# Patient Record
Sex: MALE | Race: WHITE
[De-identification: names, ages, dates, MRNs, and addresses within clinical notes are randomized per-mention and may not be internally consistent; named-entity substitution may affect disease eponyms.]

---

## 2019-07-05 NOTE — REPVR
EXAM: 

 CT Head Without Contrast 



EXAM DATE/TIME: 

 7/5/2019 10:32 PM 



CLINICAL HISTORY: 

 29 years old, male; Injury or trauma; Injury history: Hit head off wall 

multiple times; Initial encounter; Blunt trauma (contusions or hematomas); 

Consciousness not specified; Additional info: Tr 



TECHNIQUE: 

 Imaging protocol: Axial computed tomography images of the head without 

contrast. 

 Radiation optimization: All CT scans at this facility use at least one of 

these dose optimization techniques: automated exposure control; mA and/or kV 

adjustment per patient size (includes targeted exams where dose is matched to 

clinical indication); or iterative reconstruction. 



COMPARISON: 

 No relevant prior studies available. 



FINDINGS: 

 Brain: Normal. No hemorrhage. Unremarkable white matter. No mass effect. 

 Ventricles: Normal. No ventriculomegaly. 

 Bones/joints: Unremarkable. No acute fracture. 

 Sinuses: Visualized sinuses are unremarkable. No fluid levels. 

 Mastoid air cells: Visualized mastoid air cells are well aerated. No mastoid 

effusion. 

 Soft tissues: Frontal scalp injury.



IMPRESSION: 

No acute intracranial abnormality. 



Electronically signed by: Mirna Rivas On 07/05/2019  22:55:10 PM

## 2020-05-02 ENCOUNTER — HOSPITAL ENCOUNTER (EMERGENCY)
Dept: HOSPITAL 53 - M ED | Age: 30
Discharge: ANOTHER HOSPITAL | End: 2020-05-02
Payer: SELF-PAY

## 2020-05-02 VITALS — BODY MASS INDEX: 25.13 KG/M2 | HEIGHT: 73 IN | WEIGHT: 189.6 LBS

## 2020-05-02 VITALS — SYSTOLIC BLOOD PRESSURE: 120 MMHG | DIASTOLIC BLOOD PRESSURE: 69 MMHG

## 2020-05-02 DIAGNOSIS — M25.572: ICD-10-CM

## 2020-05-02 DIAGNOSIS — G06.2: Primary | ICD-10-CM

## 2020-05-02 DIAGNOSIS — L03.116: ICD-10-CM

## 2020-05-02 DIAGNOSIS — Z87.891: ICD-10-CM

## 2020-05-02 DIAGNOSIS — F19.10: ICD-10-CM

## 2020-05-02 LAB
ALBUMIN SERPL BCG-MCNC: 3.2 GM/DL (ref 3.2–5.2)
ALT SERPL W P-5'-P-CCNC: 48 U/L (ref 12–78)
AMYLASE SERPL-CCNC: 22 U/L (ref 25–115)
APTT BLD: 35.8 SECONDS (ref 25–38.4)
BASOPHILS # BLD AUTO: 0.1 10^3/UL (ref 0–0.2)
BASOPHILS NFR BLD AUTO: 0.6 % (ref 0–1)
BILIRUB CONJ SERPL-MCNC: 0.2 MG/DL (ref 0–0.2)
BILIRUB SERPL-MCNC: 0.7 MG/DL (ref 0.2–1)
CK MB CFR.DF SERPL CALC: 2.46
CK MB SERPL-MCNC: 1.6 NG/ML (ref ?–3.6)
CK SERPL-CCNC: 65 U/L (ref 39–308)
CRP SERPL-MCNC: 6.57 MG/DL (ref 0–0.3)
EOSINOPHIL # BLD AUTO: 0.2 10^3/UL (ref 0–0.5)
EOSINOPHIL NFR BLD AUTO: 1.7 % (ref 0–3)
ERYTHROCYTE [SEDIMENTATION RATE] IN BLOOD BY WESTERGREN METHOD: 109 MM/HR (ref 0–15)
HCT VFR BLD AUTO: 32.1 % (ref 42–52)
HGB BLD-MCNC: 10.4 G/DL (ref 13.5–17.5)
INR PPP: 1.04
LIPASE SERPL-CCNC: 58 U/L (ref 73–393)
LYMPHOCYTES # BLD AUTO: 2.4 10^3/UL (ref 1.5–5)
LYMPHOCYTES NFR BLD AUTO: 25.2 % (ref 24–44)
MCH RBC QN AUTO: 28.9 PG (ref 27–33)
MCHC RBC AUTO-ENTMCNC: 32.4 G/DL (ref 32–36.5)
MCV RBC AUTO: 89.2 FL (ref 80–96)
MONOCYTES # BLD AUTO: 1.2 10^3/UL (ref 0–0.8)
MONOCYTES NFR BLD AUTO: 12.9 % (ref 0–5)
NEUTROPHILS # BLD AUTO: 5.6 10^3/UL (ref 1.5–8.5)
NEUTROPHILS NFR BLD AUTO: 59.2 % (ref 36–66)
PLATELET # BLD AUTO: 253 10^3/UL (ref 150–450)
PROT SERPL-MCNC: 7.9 GM/DL (ref 6.4–8.2)
PROTHROMBIN TIME: 13.3 SECONDS (ref 11.8–14)
RBC # BLD AUTO: 3.6 10^6/UL (ref 4.3–6.1)
TROPONIN I SERPL-MCNC: < 0.02 NG/ML (ref ?–0.1)
WBC # BLD AUTO: 9.5 10^3/UL (ref 4–10)

## 2020-05-02 PROCEDURE — 72072 X-RAY EXAM THORAC SPINE 3VWS: CPT

## 2020-05-02 PROCEDURE — 82150 ASSAY OF AMYLASE: CPT

## 2020-05-02 PROCEDURE — 85025 COMPLETE CBC W/AUTO DIFF WBC: CPT

## 2020-05-02 PROCEDURE — 72157 MRI CHEST SPINE W/O & W/DYE: CPT

## 2020-05-02 PROCEDURE — 96365 THER/PROPH/DIAG IV INF INIT: CPT

## 2020-05-02 PROCEDURE — 83605 ASSAY OF LACTIC ACID: CPT

## 2020-05-02 PROCEDURE — 96375 TX/PRO/DX INJ NEW DRUG ADDON: CPT

## 2020-05-02 PROCEDURE — 96366 THER/PROPH/DIAG IV INF ADDON: CPT

## 2020-05-02 PROCEDURE — 83690 ASSAY OF LIPASE: CPT

## 2020-05-02 PROCEDURE — 71045 X-RAY EXAM CHEST 1 VIEW: CPT

## 2020-05-02 PROCEDURE — 93005 ELECTROCARDIOGRAM TRACING: CPT

## 2020-05-02 PROCEDURE — 72158 MRI LUMBAR SPINE W/O & W/DYE: CPT

## 2020-05-02 PROCEDURE — 96368 THER/DIAG CONCURRENT INF: CPT

## 2020-05-02 PROCEDURE — 86140 C-REACTIVE PROTEIN: CPT

## 2020-05-02 PROCEDURE — 80047 BASIC METABLC PNL IONIZED CA: CPT

## 2020-05-02 PROCEDURE — 93041 RHYTHM ECG TRACING: CPT

## 2020-05-02 PROCEDURE — 80076 HEPATIC FUNCTION PANEL: CPT

## 2020-05-02 PROCEDURE — 86850 RBC ANTIBODY SCREEN: CPT

## 2020-05-02 PROCEDURE — 72110 X-RAY EXAM L-2 SPINE 4/>VWS: CPT

## 2020-05-02 PROCEDURE — 81001 URINALYSIS AUTO W/SCOPE: CPT

## 2020-05-02 PROCEDURE — 85610 PROTHROMBIN TIME: CPT

## 2020-05-02 PROCEDURE — 74018 RADEX ABDOMEN 1 VIEW: CPT

## 2020-05-02 PROCEDURE — 82553 CREATINE MB FRACTION: CPT

## 2020-05-02 PROCEDURE — 82550 ASSAY OF CK (CPK): CPT

## 2020-05-02 PROCEDURE — 86901 BLOOD TYPING SEROLOGIC RH(D): CPT

## 2020-05-02 PROCEDURE — 85730 THROMBOPLASTIN TIME PARTIAL: CPT

## 2020-05-02 PROCEDURE — 99285 EMERGENCY DEPT VISIT HI MDM: CPT

## 2020-05-02 PROCEDURE — 85652 RBC SED RATE AUTOMATED: CPT

## 2020-05-02 PROCEDURE — 87040 BLOOD CULTURE FOR BACTERIA: CPT

## 2020-05-02 PROCEDURE — 73610 X-RAY EXAM OF ANKLE: CPT

## 2020-05-02 PROCEDURE — 86900 BLOOD TYPING SEROLOGIC ABO: CPT

## 2020-05-02 PROCEDURE — 36415 COLL VENOUS BLD VENIPUNCTURE: CPT

## 2020-05-02 NOTE — REP
Clinical:  Rule out foreign body.  IVDA.

 

Comparison:  None .

 

Findings:

The mediastinum and cardiac silhouette are stable and within normal limits for

portable technique.  The lung fields are clear without acute consolidation,

effusion, or pneumothorax.  Skeletal structures are intact. No foreign body

identified.

 

Impression:

No acute cardiopulmonary process appreciated.

 

 

Electronically Signed by

Tyrese Garsia MD 05/02/2020 11:26 A

## 2020-05-02 NOTE — REP
Clinical:  IVDA.  Foreign body.

 

Technique:  AP, lateral, bilateral oblique, and coned-down views.

 

Findings:  Alignment and lordosis is maintained.  The vertebral bodies including

transverse process and spinous processes are intact and normal.  There is no

evidence for acute fracture / compression injury or subluxation.  No evidence for

spondylolysis or spondylolisthesis.  No significant degenerative change is noted.

No foreign body identified.

 

Impression:

Normal lumbosacral spine radiograph series.

 

 

Electronically Signed by

Tyrese Garsia MD 05/02/2020 11:29 A

## 2020-05-02 NOTE — REP
Clinical:  Rule out foreign body.  IUDA.

 

Technique:  AP, lateral, and swimmers views.

 

Findings:  No acute fracture / compression injury or subluxation.  No significant

degenerative changes.  Paravertebral soft tissues are normal. No foreign body

identified.

 

Impression:

No foreign body identified.

 

 

Electronically Signed by

Tyrese Garsia MD 05/02/2020 11:28 A

## 2020-05-02 NOTE — REP
Clinical:  Pain.  Redness.

 

Technique:  AP, lateral, bilateral oblique views of the left ankle.

 

Findings:

Swelling suggested.  No acute fracture or dislocation.  Ankle mortise intact.

 

Impression:

Soft-tissue swelling.  No acute fracture.

 

 

Electronically Signed by

Tyrese Garsia MD 05/02/2020 11:25 A

## 2020-05-02 NOTE — ECGEPIP
[facility] - ED

                                       

                                       Test Date:    2020

Pat Name:     ANAT CALDERA               Department:   

Patient ID:   [id number]                 Room:         -

Gender:       Male                     Technician:   grant

:          1990               Requested By: Maja Lundborg-Gray 

Order Number: [id number]     Reading MD:   Maja Lundborg-Gray

                                 Measurements

Intervals                              Axis          

Rate:         82                       P:            35

IL:           151                      QRS:          65

QRSD:         93                       T:            21

QT:           354                                    

QTc:          414                                    

                           Interpretive Statements

SINUS RHYTHM

NONSPECIFIC ST T WAVE CHANGES

NO PRIOR ECG FOR COMPARISON

DELAYED R WAVE PROGRESSION

Electronically Signed on 2020 19:29:54 EDT by Maja Lundborg-Gray

## 2020-05-02 NOTE — REP
Clinical:  IVDA.  Rule out foreign body.

 

Technique:  Single supine view of the abdomen and pelvis.

 

Findings:  Bowel gas pattern is nonspecific.  No organomegaly.  No abnormal

calcifications or foreign body.  Skeletal structures are intact.

 

Impression:  Nonspecific abdominal radiograph.

 

 

Electronically Signed by

Tyrese Garsia MD 05/02/2020 11:27 A

## 2020-05-21 ENCOUNTER — HOSPITAL ENCOUNTER (INPATIENT)
Dept: HOSPITAL 53 - M ED | Age: 30
LOS: 1 days | Discharge: LEFT BEFORE DISCHARGE | DRG: 347 | End: 2020-05-22
Attending: INTERNAL MEDICINE | Admitting: INTERNAL MEDICINE
Payer: MEDICAID

## 2020-05-21 VITALS — DIASTOLIC BLOOD PRESSURE: 88 MMHG | SYSTOLIC BLOOD PRESSURE: 138 MMHG

## 2020-05-21 VITALS — WEIGHT: 194.01 LBS | HEIGHT: 73 IN | BODY MASS INDEX: 25.71 KG/M2

## 2020-05-21 VITALS — SYSTOLIC BLOOD PRESSURE: 160 MMHG | DIASTOLIC BLOOD PRESSURE: 77 MMHG

## 2020-05-21 DIAGNOSIS — M46.44: Primary | ICD-10-CM

## 2020-05-21 DIAGNOSIS — F15.20: ICD-10-CM

## 2020-05-21 DIAGNOSIS — R60.0: ICD-10-CM

## 2020-05-21 DIAGNOSIS — B18.2: ICD-10-CM

## 2020-05-21 DIAGNOSIS — L53.0: ICD-10-CM

## 2020-05-21 DIAGNOSIS — F11.20: ICD-10-CM

## 2020-05-21 DIAGNOSIS — R45.851: ICD-10-CM

## 2020-05-21 DIAGNOSIS — D64.9: ICD-10-CM

## 2020-05-21 DIAGNOSIS — Z79.899: ICD-10-CM

## 2020-05-21 DIAGNOSIS — R59.0: ICD-10-CM

## 2020-05-21 LAB
ALBUMIN SERPL BCG-MCNC: 3.6 GM/DL (ref 3.2–5.2)
ALT SERPL W P-5'-P-CCNC: 124 U/L (ref 12–78)
AMPHETAMINES UR QL SCN: NEGATIVE
APAP SERPL-MCNC: < 2 UG/ML (ref 10–30)
BARBITURATES UR QL SCN: NEGATIVE
BENZODIAZ UR QL SCN: NEGATIVE
BILIRUB CONJ SERPL-MCNC: 0.3 MG/DL (ref 0–0.2)
BILIRUB SERPL-MCNC: 0.9 MG/DL (ref 0.2–1)
BUN SERPL-MCNC: 17 MG/DL (ref 7–18)
BZE UR QL SCN: POSITIVE
CALCIUM SERPL-MCNC: 9.1 MG/DL (ref 8.5–10.1)
CANNABINOIDS UR QL SCN: NEGATIVE
CHLORIDE SERPL-SCNC: 105 MEQ/L (ref 98–107)
CO2 SERPL-SCNC: 24 MEQ/L (ref 21–32)
CREAT SERPL-MCNC: 0.9 MG/DL (ref 0.7–1.3)
CRP SERPL-MCNC: 13.8 MG/DL (ref 0–0.3)
ERYTHROCYTE [SEDIMENTATION RATE] IN BLOOD BY WESTERGREN METHOD: 63 MM/HR (ref 0–15)
ETHANOL SERPL-MCNC: < 0.003 % (ref 0–0.01)
GFR SERPL CREATININE-BSD FRML MDRD: > 60 ML/MIN/{1.73_M2} (ref 60–?)
GLUCOSE SERPL-MCNC: 89 MG/DL (ref 70–100)
HCT VFR BLD AUTO: 34 % (ref 42–52)
HGB BLD-MCNC: 11.1 G/DL (ref 13.5–17.5)
MAGNESIUM SERPL-MCNC: 2 MG/DL (ref 1.8–2.4)
MCH RBC QN AUTO: 29 PG (ref 27–33)
MCHC RBC AUTO-ENTMCNC: 32.6 G/DL (ref 32–36.5)
MCV RBC AUTO: 88.8 FL (ref 80–96)
METHADONE UR QL SCN: NEGATIVE
OPIATES UR QL SCN: POSITIVE
PCP UR QL SCN: NEGATIVE
PLATELET # BLD AUTO: 209 10^3/UL (ref 150–450)
POTASSIUM SERPL-SCNC: 3.6 MEQ/L (ref 3.5–5.1)
PROT SERPL-MCNC: 8 GM/DL (ref 6.4–8.2)
RBC # BLD AUTO: 3.83 10^6/UL (ref 4.3–6.1)
SALICYLATES SERPL-MCNC: < 1.7 MG/DL (ref 5–30)
SODIUM SERPL-SCNC: 140 MEQ/L (ref 136–145)
TSH SERPL DL<=0.005 MIU/L-ACNC: 1.15 UIU/ML (ref 0.36–3.74)
WBC # BLD AUTO: 7.6 10^3/UL (ref 4–10)

## 2020-05-21 RX ADMIN — SODIUM CHLORIDE SCH UNITS: 4.5 INJECTION, SOLUTION INTRAVENOUS at 20:38

## 2020-05-21 RX ADMIN — SODIUM CHLORIDE, PRESERVATIVE FREE SCH ML: 5 INJECTION INTRAVENOUS at 20:36

## 2020-05-21 RX ADMIN — CEFEPIME HYDROCHLORIDE SCH MLS/HR: 2 INJECTION, POWDER, FOR SOLUTION INTRAVENOUS at 15:45

## 2020-05-21 RX ADMIN — SODIUM CHLORIDE SCH UNITS: 4.5 INJECTION, SOLUTION INTRAVENOUS at 20:35

## 2020-05-21 RX ADMIN — CEFEPIME HYDROCHLORIDE SCH MLS/HR: 2 INJECTION, POWDER, FOR SOLUTION INTRAVENOUS at 23:44

## 2020-05-21 NOTE — REP
Duplex extremity venous ultrasound: Bilateral lower extremities.

 

History:  Bilateral lower extremity swelling, right greater than left.

 

Findings:  The deep veins are anechoic and fully compressible from the groin to

the popliteal fossa in the left and right lower extremity.  Color flow imaging is

homogeneous.  Spectral Doppler interrogation demonstrates intact respiratory

variation in flow and normal manual augmentation of flow.  There is no evidence

of deep vein thrombosis.

 

Multiple hypertrophied lymph nodes are noted in the inguinal soft tissues

bilaterally.  The largest on the right measures 3.7 x 2.0 x 1.0 cm.  The largest

lymph node on the left measures 2.6 x 0.9 x 2.0 cm.

 

Impression:

 

Bilateral inguinal adenopathy.  Otherwise negative bilateral lower extremity

duplex venous ultrasound.  No evidence of deep vein thrombosis.

 

 

Electronically Signed by

Tevin Rodriguez MD 05/21/2020 04:55 P

## 2020-05-21 NOTE — HPEPDOC
General


Date of Admission


May 21, 2020 at 14:19


Date of Service:  May 21, 2020


Chief Complaint


The patient is a 30-year-old male who was brought to the ER after he was found 

with holding a machete at a car wash





History of Present Illness


Patient is a 30-year-old  male with a PMHx of Substance abuse (with 

Intravenous Methamphetamines and Opiates), Hepatitis C, Anemia, who was recently

found to have Osteomyelitis / Diskitis of T8-T9.





Patient had originally presented to [facility] on 5/2/20 with 

complaints of back pain. Imaging completed via MRI head revealed evidence of 

phlegmon/abscess at the posterior aspect of T8/T9 extending to the right with 

narrowing of the right lateral recess and compression of the thecal sac. Patient

was subsequently transferred to [facility] for further management.





Upon transfer to [facility], patient received an IR guided biopsy on 5/4, 

which had revealed acute and chronic osteomyelitis. Orthopedic surgery was 

consult and had not recommended any surgery, but did recommended TLSO brace. An 

echocardiogram was performed and was negative for any evidence of endocarditis. 

Blood cultures were positive for Enterobacter cloacae. Infectious disease had 

recommended cefepime 2 g every 8 for total of 6 weeks duration, starting 5/6.





Patient was ultimately discharged from [facility] and transferred to 

[facility] for drug rehabilitation on 5/7. Patient had ultimately left [place] on 5/15

because he had reported that his girlfriend had left him.





Hospitalist service was called for evaluation. Patient was seen and examined 

while in the behavioral health section of the emergency room. Currently patient 

denies any headache, nausea, vomiting, chest pain, short of breath, 

palpitations, abdominal pain, constipation, diarrhea, or urinary discomfort.





Patient is not aware of any recent fevers or chills.





Patient reports his appetite is fairly normal. Has not reported any significant 

changes in his weight.





Home Medications


Scheduled


Levofloxacin (Levofloxacin) 750 Mg Tablet, 750 MG PO DAILY, (Reported)


   FILLED 5/16/20 FOR 14 DAYS 


Magnesium Oxide (Magnesium Oxide) 400 Mg Tablet, 400 MG PO DAILY, (Reported)


Multivitamins (Thera M Plus Tablet) 1 Each Tablet, 1 TAB PO DAILY, (Reported)





Scheduled PRN


Hydroxyzine HCl (Hydroxyzine HCl) 25 Mg Tablet, 25 MG PO TID PRN for ANXIETY, 

(Reported)





Allergies


Coded Allergies:  


     No Known Allergies (Unverified , 7/6/19)





Past Medical History


Medical History


Substance abuse (with Intravenous Methamphetamines and Opiates)


Hepatitis C


Anemia,


Osteomyelitis / Diskitis of T8-T9


Surgical History


Patient denies any prior surgical history other than the biopsy that was 

completed on 5/4





Family History


- No history of malignancies





Social History


- Denies the use of tobacco; patient reports social alcohol use. He does report 

the use intravenous methamphetamines


- Denies recent travel or sick contacts


- Lives alone


- Unemployed





Review of Systems


Other systems


10 point review of systems complete, all negative otherwise stated in HPI





Vital Signs


- Vitals: /76, HR 82, RR 20, Sat 99%RA, Temp 98.2F


- General: Sitting up in bed and eating a meal, Speaking in full sentences, He 

is oriented to person / place / time


- HEENT: NC, AT, PERRLA   


- CVS: RRR, +S1S2


- Lungs: Fair air entry bilaterally, No appreciable wheezing / rales / rhonchi 


- Abdomen: Soft, Non-distended, Non-tender


- Extremities: Trace pitting edema bilaterally (R>L), No calf tenderness


- Neuro: No focal motor or sensory deficit


- Skin: No visible rashes





Laboratory Data


Labs 24H


Laboratory Tests 2


5/21/20 07:57: 


Urine Opiates Screen POSITIVEH, Urine Methadone Screen NEGATIVE, Urine 

Barbiturates Screen NEGATIVE, Urine Phencyclidine Screen NEGATIVE, Urine 

Amphetamines Screen NEGATIVE, Urine Benzodiazepines Screen NEGATIVE, Urine 

Cocaine Metabolite Screen POSITIVEH, Urine Cannabinoids Screen NEGATIVE


5/21/20 08:14: Lactic Acid Level 1.0


5/21/20 08:15: 


Nucleated Red Blood Cells % (auto) 0.0, Erythrocyte Sedimentation Rate 63H, 

Anion Gap 11, Glomerular Filtration Rate > 60.0, Calcium Level 9.1, Total 

Bilirubin 0.9, Direct Bilirubin 0.3H, Aspartate Amino Transf (AST/SGOT) 188H, 

Alanine Aminotransferase (ALT/SGPT) 124H, Alkaline Phosphatase 95, C-Reactive 

Protein, Quantitative 13.80H, Total Protein 8.0, Albumin 3.6, Albumin/Globulin 

Ratio 0.8, Thyroid Stimulating Hormone (TSH) 1.150, Salicylates Level < 1.7L, 

Acetaminophen Level < 2.0L, Ethyl Alcohol Level < 0.003


CBC/BMP


Laboratory Tests


5/21/20 08:15








Microbiology





Microbiology


5/21/20 Blood Culture, Received


          Pending


5/21/20 Blood Culture, Received


          Pending





Plan / VTE


VTE Prophylaxis Ordered?:  Yes





Plan


Plan


Acute on Chronic osteomyelitis of T8/9 - 2/2 Enterobacter cloacae - likely 2/2 

IV drug abuse


- Patient was recently diagnosed via biopsy at [facility] on 5/4 with

acute on chronic osteomyelitis


- Medical records from [facility] have been reviewed


- Physical currently does not reveal any focal neurologic deficits


- She is hemodynamically stable and afebrile


- ESR and CRP are elevated; no leukocytosis or lactic acidosis


- Will keep patient on telemetry monitoring 


- Will check blood cultures will check ECHO 


- Will start patient on cefepime 2 g every 8 hours; based on prior 

susceptibilities from [facility]


- Discussed case with infectious disease will also be on consultation; will 

discuss case with ID at [facility]





LE Edema (R>L)


- Patient is saturating well on room air and he does not appear to be in any 

respiratory distress


- Patient is unsure how long hes been experiencing leg swelling


- Echocardiogram completed at [facility] 5/3: Normal LV systolic 

function, EF 62%, no regional wall motion abnormalities, no evidence of 

diastolic dysfunction, rales, appear to be structurally normal, no pericardial 

effusion, normal IVC with preserved inspiratory collapse


- Will check duplex ultrasound of lower extremities as well as echocardiogram





Questionable suicidal ideation


- Patient was brought to emergency room after he was found wielding a machete


- Patient was reported to have made suicidal ideation statements


- Upon questioning. Currently, he has denied any suicidal or homicidal ideation


- He indicated that he had a knife because he was planning to sell it for drugs


- Will continue with bedside sitter and suicidal precautions


- Case discussed with psychiatry and will be performing a consultation





Substance abuse 


- Hx of Intravenous Methamphetamines and Opiates


- Urine chemistry on 5/21 was positive 





Hepatitis C


- Elevation of AST, ALT


- Will continue to monitor trend


- Will check US Liver





Anemia


- Hg appears to be at similar level compared to 5/2


- No evidence of bleeding 


- Will continue to monitor 





DVT prophylaxis 


- Will start Heparin SQ











KRISTA MCMAHON MD                May 21, 2020 16:05

## 2020-05-22 VITALS — SYSTOLIC BLOOD PRESSURE: 113 MMHG | DIASTOLIC BLOOD PRESSURE: 86 MMHG

## 2020-05-22 VITALS — SYSTOLIC BLOOD PRESSURE: 139 MMHG | DIASTOLIC BLOOD PRESSURE: 70 MMHG

## 2020-05-22 VITALS — SYSTOLIC BLOOD PRESSURE: 137 MMHG | DIASTOLIC BLOOD PRESSURE: 81 MMHG

## 2020-05-22 LAB
ALBUMIN SERPL BCG-MCNC: 2.6 GM/DL (ref 3.2–5.2)
ALT SERPL W P-5'-P-CCNC: 216 U/L (ref 12–78)
BASOPHILS # BLD AUTO: 0 10^3/UL (ref 0–0.2)
BASOPHILS NFR BLD AUTO: 0.7 % (ref 0–1)
BILIRUB CONJ SERPL-MCNC: 0.2 MG/DL (ref 0–0.2)
BILIRUB SERPL-MCNC: 0.5 MG/DL (ref 0.2–1)
BUN SERPL-MCNC: 12 MG/DL (ref 7–18)
CALCIUM SERPL-MCNC: 8.1 MG/DL (ref 8.5–10.1)
CHLORIDE SERPL-SCNC: 107 MEQ/L (ref 98–107)
CO2 SERPL-SCNC: 28 MEQ/L (ref 21–32)
CREAT SERPL-MCNC: 0.62 MG/DL (ref 0.7–1.3)
CRP SERPL-MCNC: 8.04 MG/DL (ref 0–0.3)
EOSINOPHIL # BLD AUTO: 0.3 10^3/UL (ref 0–0.5)
EOSINOPHIL NFR BLD AUTO: 7.8 % (ref 0–3)
ERYTHROCYTE [SEDIMENTATION RATE] IN BLOOD BY WESTERGREN METHOD: 52 MM/HR (ref 0–15)
GFR SERPL CREATININE-BSD FRML MDRD: > 60 ML/MIN/{1.73_M2} (ref 60–?)
GLUCOSE SERPL-MCNC: 108 MG/DL (ref 70–100)
HCT VFR BLD AUTO: 32 % (ref 42–52)
HGB BLD-MCNC: 10.4 G/DL (ref 13.5–17.5)
HIV 1+2 AB+HIV1 P24 AG SERPL QL IA: NEGATIVE
LYMPHOCYTES # BLD AUTO: 0.8 10^3/UL (ref 1.5–5)
LYMPHOCYTES NFR BLD AUTO: 18.9 % (ref 24–44)
MAGNESIUM SERPL-MCNC: 1.7 MG/DL (ref 1.8–2.4)
MCH RBC QN AUTO: 29.1 PG (ref 27–33)
MCHC RBC AUTO-ENTMCNC: 32.5 G/DL (ref 32–36.5)
MCV RBC AUTO: 89.6 FL (ref 80–96)
MONOCYTES # BLD AUTO: 0.5 10^3/UL (ref 0–0.8)
MONOCYTES NFR BLD AUTO: 11.5 % (ref 0–5)
NEUTROPHILS # BLD AUTO: 2.7 10^3/UL (ref 1.5–8.5)
NEUTROPHILS NFR BLD AUTO: 60.9 % (ref 36–66)
PLATELET # BLD AUTO: 167 10^3/UL (ref 150–450)
POTASSIUM SERPL-SCNC: 3.7 MEQ/L (ref 3.5–5.1)
PROT SERPL-MCNC: 6.1 GM/DL (ref 6.4–8.2)
RBC # BLD AUTO: 3.57 10^6/UL (ref 4.3–6.1)
SODIUM SERPL-SCNC: 142 MEQ/L (ref 136–145)
WBC # BLD AUTO: 4.4 10^3/UL (ref 4–10)

## 2020-05-22 RX ADMIN — SODIUM CHLORIDE SCH UNITS: 4.5 INJECTION, SOLUTION INTRAVENOUS at 05:57

## 2020-05-22 RX ADMIN — SODIUM CHLORIDE, PRESERVATIVE FREE SCH ML: 5 INJECTION INTRAVENOUS at 14:00

## 2020-05-22 RX ADMIN — SODIUM CHLORIDE, PRESERVATIVE FREE SCH ML: 5 INJECTION INTRAVENOUS at 05:57

## 2020-05-22 RX ADMIN — CEFEPIME HYDROCHLORIDE SCH MLS/HR: 2 INJECTION, POWDER, FOR SOLUTION INTRAVENOUS at 08:00

## 2020-05-22 RX ADMIN — SODIUM CHLORIDE SCH UNITS: 4.5 INJECTION, SOLUTION INTRAVENOUS at 14:00

## 2020-05-22 NOTE — IPN
DATE:  05/22/2020

 

Emigdio is on the phone with his girlfriend and is not interested in talking to me.

He states he only wants to see the psychiatrist so he can go home.  He has mild

mid-thoracic back pain which is not any worse.  He denies any discomfort in his

feet.  When asked if he knew they were red, he did not realize that.

 

LABORATORIES:

White count is 4.4, hemoglobin 10.4, hematocrit 32, platelets 167, 60%

neutrophils, 19% lymphocytes, 11% monocytes.  ESR 52, down from 63.  Sodium 142,

potassium 3.7, chloride 107, bicarbonate 28 BUN 12, creatinine 0.62, glucose 
108,

calcium 8.1, magnesium 1.7, , , alkaline phosphatase 94.  CRP 
8.04,

down from 13.8.  HIV negative.  Syphilis serology negative.  Drug screen was

positive for opiates and cocaine.  Blood cultures two sets on 05/21/2020 are

negative.

 

MEDICATIONS:

- cefepime 2 grams IV every 8 hours  (patient has refused the dose this morning)

 

 

Heart:  Normal, S1, S2.  No murmurs, rubs or gallops.

 

Lungs:  Clear.

 

Back:  Mid thoracic tenderness.

 

Extremities:  Bilateral erythema with scab marks below the knee.

 

IMPRESSION:

1.  T8-T9 discitis with culture positive for Enterobacter cloacae sensitive to

levofloxacin.  Patient has refused his cefepime.  I doubt the patient will 
comply

with his medication.  Will start him on levofloxacin 750 mg daily if we can

convince him to stay and take his IV antibiotics, then you can resume cefepime.

 

2.  Lower extremity erythema with bilateral inguinal adenopathy possibly related

to IV drug use.

 

PLAN:

Switch to levofloxacin 750 mg daily.  Followup with infectious disease (ID) as 
an

outpatient in 2 weeks.

LOIDA

## 2020-05-22 NOTE — IPNPDOC
Text Note


Date of Service


The patient was seen on 5/22/20.





NOTE


Subjective:


Patient seen and examined at bedside. No acute overnight events reported. 

Patient was somnolent but easily arousable during examination. Reported by 

nursing later in the morning that he was planning on leaving [facility] after his 

shower. He later changed his mind, agreeing to stay, but refusing treatment.





Patient is a 30-year-old  male with a PMHx of Substance abuse (with 

Intravenous Methamphetamines and Opiates), Hepatitis C, Anemia, who was recently

found to have Osteomyelitis / Diskitis of T8-T9.





Patient had originally presented to [facility] on 5/2/20 with 

complaints of back pain. Imaging completed via MRI head revealed evidence of ph

legmon/abscess at the posterior aspect of T8/T9 extending to the right with 

narrowing of the right lateral recess and compression of the thecal sac. Patient

was subsequently transferred to [facility] for further management.





Upon transfer to [facility], patient received an IR guided biopsy on 5/4, 

which had revealed acute and chronic osteomyelitis. Orthopedic surgery was 

consult and had not recommended any surgery, but did recommended TLSO brace. An 

echocardiogram was performed and was negative for any evidence of endocarditis. 

Blood cultures were positive for Enterobacter cloacae. Infectious disease had 

recommended cefepime 2 g every 8 for total of 6 weeks duration, starting 5/6.





Patient was ultimately discharged from [facility] and transferred to 

[facility] for drug rehabilitation on 5/7. Patient had ultimately left [place] on 5/15

because he had reported that his girlfriend had left him.





Objective:


Physical Exam


Vitals: See below


General: somnolent, easily arousable, answers questions with prompting


HEENT: NC/AT, PERRL


Heart: +S1S2, RRR, -M/R/G


Lungs: CTA B/L


Abd: soft, NT, +BS


Ext: 2-3+ LE edema R>L


Neuro: No focal motor or sensory deficit


Skin: No visible rashes





Assessment/Plan:





#discitis/osteomyelitis


- T8/9 - 2/2 Enterobacter cloacae - likely 2/2 IV drug abuse


- Patient was recently diagnosed via biopsy at [facility] on 5/4 with

acute on chronic osteomyelitis


- Medical records from [facility] have previously been reviewed


- does not reveal any focal neurologic deficits


- hemodynamically stable and afebrile


- ESR and CRP are elevated, but trending down


- Will keep patient on telemetry monitoring 


- Will check blood cultures - negative to date; will check ECHO 


- Will start patient on cefepime 2 g every 8 hours; based on prior 

susceptibilities from [facility]


- ID c/s pending - assistance appreciated





#LE Edema (R>L)


- Patient is unsure how long hes been experiencing leg swelling


- Echocardiogram completed at [facility] 5/3: Normal LV systolic 

function, EF 62%, no regional wall motion abnormalities, no evidence of 

diastolic dysfunction, rales, appear to be structurally normal, no pericardial 

effusion, normal IVC with preserved inspiratory collapse


- duplex ultrasound of lower extremities shows bilateral inguinal adenopathy


- echocardiogram pending





#B/L inguinal adenopathy


- as noted on imaging





#Questionable suicidal ideation


- Patient was brought to emergency room after he was found wielding a machete


- Patient was reported to have made suicidal ideation statements


- Upon questioning - denied any suicidal or homicidal ideation


- indicated that he had a knife because he was planning to sell it for drugs


- Will continue with bedside sitter and suicidal precautions


- psych c/s pending





#Substance abuse 


- Hx of Intravenous Methamphetamines and Opiates


- urine tox screen on 5/21 was positive for opiates/cocaine





#Hepatitis C


- Elevation of AST, ALT


- Will continue to monitor trend


- US Liver unremarkable





#Anemia


- Hg appears to be at similar level compared to 5/2


- No evidence of bleeding 


- Will continue to monitor 





#DVT prophylaxis 


- Heparin SQ





Dispo: Pending ID c/s, psych c/s





VS,Fishbone, I+O


VS, Fishbone, I+O


Laboratory Tests


5/22/20 05:12











Vital Signs








  Date Time  Temp Pulse Resp B/P (MAP) Pulse Ox O2 Delivery O2 Flow Rate FiO2


 


5/22/20 04:00 97.6 76 18 139/70 (93) 99 Room Air  














I&O- Last 24 Hours up to 6 AM 


 


 5/22/20





 06:00


 


Intake Total 1355 ml


 


Output Total 0 ml


 


Balance 1355 ml

















COREY ROSS MD              May 22, 2020 09:49

## 2020-05-22 NOTE — REP
Clinical:  Elevated liver function tests.

 

Technique:  Real time gray scale ultrasound examination using curved array

transducer.

 

Findings:

Liver and pancreas are normal in contour, size, echogenicity without focal

hepatic or pancreatic lesion identified.  Gallbladder is normal and without

gallstones, wall thickening, or pericholecystic fluid.  Biliary ductal dilatation

is appreciated and the common bile duct measures 5 mm diameter.  Right kidney is

normal in reniform shape without hydronephrosis and measures 11.9 x 7.0 x 5.1

cm.

 

Impression:

Normal liver and right upper quadrant ultrasound.

 

 

Electronically Signed by

Tyrese Garsia MD 05/22/2020 02:07 A

## 2020-05-22 NOTE — CR
DATE OF CONSULTATION:  05/21/2020

 

Asked to consult by Dr. Btuts for evaluation of T8-T9 discitis.

 

HISTORY OF PRESENT ILLNESS:

Emigdio is a 30-year-old gentleman who came into [facility] on

05/02/2020 with mid thoracic back pain.  The patient had an MRI, which revealed

phlegmon abscess at T8-T9 extending to the right lateral recess and compression

of the thecal sac.  The patient was transferred to [facility] for further

management.  He was there until 05/07/2020.  He had a peripherally inserted

central catheter (PICC) line placed, epidural and biopsy done of T8-T9, which

showed acute on chronic osteomyelitis.  Culture was positive for Enterobacter

cloacae complex, which was sensitive to gentamicin, Bactrim, tazobactam,

ciprofloxacin, levofloxacin, meropenem,  ceftazidime,

ceftriaxone, tobramycin, and cefepime.  The patient was treated with IV cefepime

2 grams every 8 hours for a total of 6 weeks.  He was transferred to [facility] for rehab and IV antibiotics but left against medical advice on

05/15/2020, because he states his girlfriend left him.  The patient was brought

in by the police after he was found to be holding a machete at a car wash.  He

was admitted to the floor asking to leave has agreed to stay 1 day.  He denies

any headache.  No nausea, vomiting or diarrhea.  No chest pain or shortness of

breath.  He states his pain is slightly better.  He has no abdominal pain,

constipation or diarrhea.

 

MEDICATIONS:

- levofloxacin filled on 05/16/2020 for 14 days

- magnesium oxide 400 mg daily

- multivitamin 1 tablet daily

- We started IV cefotaxime 2 grams every 8 since he has been here.

 

ALLERGIES:

NO KNOWN DRUG ALLERGIES.

 

PAST MEDICAL HISTORY:

Includes substance abuse including IV methamphetamine and opiates, chronic

hepatitis C, anemia of chronic disease, osteomyelitis, discitis, and epidural

abscess of T8-T9 with Enterobacter cloacae complex that is post biopsy on

05/06/2020 at [facility].

 

SURGICAL HISTORY:  Negative.

 

FAMILY HISTORY:  Nonrevealing.

 

SOCIAL HISTORY:

He does not smoke but drinks alcohol and used IV methamphetamines.  Lives alone

in [place].

 

On physical exam, he is healthy-looking, somewhat agitated gentleman, in no 
acute

distress.

Temperature is 98.9, pulse 89, respirations 18, blood pressure 160/77, oxygen

saturation 100% on room air.

Heart:  Normal S1, S2.  No murmur, rubs or gallops.

Lungs are clear.  No wheezes, rales or rhonchi.

Abdomen:  Soft, nontender.  No hepatomegaly.

Back:  No costovertebral angle (CVA) tenderness.  He has mild T8-T9 tenderness.

No swelling.

Extremities:  +1 bilateral pitting edema with erythema of both feet.  Tattoos.

+1 dorsalis pedis pulses.

Neurologic:  Exam normal.  The patient is able to sit up on his own, although

somewhat lethargic.

 

LABORATORY DATA:

White count is 7.6, hemoglobin 11.1, hematocrit 34, and platelets 209.  ESR 63,

down from 109.  Sodium 140, potassium 3.6, chloride 105, bicarbonate 24, BUN 17,

creatinine 0.9, glucose 89.  Magnesium 2, bilirubin 0.90, , ,

alkaline phosphatase 95, CRP 13.8, total protein 8, albumin 8, TSH 1.15.

 

Micro:  Stool cultures two set that were done on 05/02/2020 are negative.  Blood

cultures on 05/21/20 two sets are pending.

 

Vascular ultrasound of lower extremities showed bilateral inguinal adenopathy,

otherwise negative.  No deep venous thrombosis (DVT).  Largest lymph nodes on

right side measures 3.7 x 2.1 cm and on the left side 2.9 x 2 cm.  Liver

ultrasound is pending.  Lumbar thoracic spine MRI done on 05/02/2020 does not

have any read.

 

IMPRESSION:

This is a 30-year-old gentleman with a history of IV drug use, especially

methamphetamines and opiates, with chronic hepatitis C, T8-T9 discitis and

epidural abscess who was brought in agitated and confused.  He was restarted on

IV cefepime at a dose of 2 grams every 8 hours, but the patient is threatening 
to

leave again.  He has swelling of both lower extremities with bilateral inguinal

adenopathy, mild erythema of both legs could be a superimposed bacterial

infection from being barefoot.  Possibly also from injection drug use.

 

PLAN:

Continue with cefepime.  Obtain MRSA screen.  If MRSA is positive, would add as

well vancomycin for lower extremity cellulitis.  Otherwise, cefepime should be

appropriate coverage.  If the patient threatens to leave against medical advice

(AMA) again, then he could continue with his levofloxacin course for a total of 
4

weeks.  Will add an HIV and syphilis testing.  As far as his hepatitis C, 
further

workup will be done as an outpatient.

LOIDA

## 2020-05-22 NOTE — DS.PDOC
Discharge Summary


General


Date of Admission


May 21, 2020 at 14:19


Date of Discharge


5/22/20


Specialist/Consultants Involve


Infectious disease


Psychiatry





Discharge Summary


PROCEDURES PERFORMED DURING STAY: [None].





ADMITTING DIAGNOSES: 


1. .





DISCHARGE DIAGNOSES:


1. .





COMPLICATIONS/CHIEF COMPLAINT: Infection Caused By Enterobacter Cloacae.





HOSPITAL COURSE: 





Patient is a 30-year-old  male with a PMHx of Substance abuse (with IV 

Methamphetamines and Opiates), Hepatitis C, Anemia, who was recently found to 

have Osteomyelitis / Diskitis of T8-T9.  Patient had originally presented to 

[facility] on 5/2/20 with complaints of back pain. Imaging 

completed via MRI head revealed evidence of phlegmon/abscess at the posterior 

aspect of T8/T9 extending to the right with narrowing of the right lateral 

recess and compression of the thecal sac. Patient was subsequently transferred 

to [facility] for further management.





Upon transfer to [facility], patient received an IR guided biopsy on 5/4, 

which had revealed acute and chronic osteomyelitis. Orthopedic surgery was 

consult and had not recommended any surgery, but did recommended TLSO brace. An 

echocardiogram was performed and was negative for any evidence of endocarditis. 

Blood cultures were positive for Enterobacter cloacae. Infectious disease had 

recommended cefepime 2 g every 8 for total of 6 weeks duration, starting 5/6.





Patient was ultimately discharged from [facility] and transferred to 

[facility] for drug rehabilitation on 5/7. Patient had ultimately left AMA on 5/15

 because he had reported that his girlfriend had left him.





Patient was brought to ED by police, as he was found behaving erratically with a

 machete. Admitted and seen by psychiatry and infectious disease. Patient left 

AMA.





DISCHARGE MEDICATIONS: Please see below.


 


ALLERGIES: Please see below.





PHYSICAL EXAMINATION ON DISCHARGE:


VITAL SIGNS: Please see below.


Vitals: See below


General: somnolent, easily arousable, answers questions with prompting


HEENT: NC/AT, PERRL


Heart: +S1S2, RRR, -M/R/G


Lungs: CTA B/L


Abd: soft, NT, +BS


Ext: 2-3+ LE edema R>L


Neuro: No focal motor or sensory deficit


Skin: No visible rashes





LABORATORY DATA: Please see below.





IMAGING: 





PROGNOSIS: Poor prognosis





ACTIVITY: [As tolerated].





DIET: 





DISCHARGE PLAN: 





DISPOSITION: 07 Against Medical Advice.





DISCHARGE INSTRUCTIONS:


1. .





ITEMS TO FOLLOWUP ON ON OUTPATIENT:


1. .





TIME SPENT ON DISCHARGE: Greater than 30 minutes.





Vital Signs/I&Os





Vital Signs








  Date Time  Temp Pulse Resp B/P (MAP) Pulse Ox O2 Delivery O2 Flow Rate FiO2


 


5/22/20 12:00 98.2 76 18 137/81 (99) 100 Room Air  














I&O- Last 24 Hours up to 6 AM 


 


 5/22/20





 06:00


 


Intake Total 1355 ml


 


Output Total 0 ml


 


Balance 1355 ml











Laboratory Data


Labs 24H


Laboratory Tests 2


5/22/20 05:12: 


Immature Granulocyte % (Auto) 0.2, Neutrophils (%) (Auto) 60.9, Lymphocytes (%) 

(Auto) 18.9L, Monocytes (%) (Auto) 11.5H, Eosinophils (%) (Auto) 7.8H, Basophils

(%) (Auto) 0.7, Neutrophils # (Auto) 2.7, Lymphocytes # (Auto) 0.8L, Monocytes #

(Auto) 0.5, Eosinophils # (Auto) 0.3, Basophils # (Auto) 0.0, Nucleated Red 

Blood Cells % (auto) 0.0, Erythrocyte Sedimentation Rate 52H, Anion Gap 7L, 

Glomerular Filtration Rate > 60.0, Calcium Level 8.1L, Magnesium Level 1.7L, 

Total Bilirubin 0.5, Direct Bilirubin 0.2, Aspartate Amino Transf (AST/SGOT) 

355H, Alanine Aminotransferase (ALT/SGPT) 216H, Alkaline Phosphatase 94, C-

Reactive Protein, Quantitative 8.04H, Total Protein 6.1#L, Albumin 2.6#L, 

Albumin/Globulin Ratio 0.7, Syphilis Serology NONREACTIVE, HIV Antigen/Antibody 

Combo Qual NEGATIVE


CBC/BMP


Laboratory Tests


5/22/20 05:12











Microbiology





Microbiology


5/21/20 Blood Culture - Preliminary, Resulted


          No growth after 24 hours . All specim...


5/21/20 Blood Culture - Preliminary, Resulted


          No growth after 24 hours . All specim...





Discharge Medications


Scheduled


Levofloxacin (Levofloxacin) 750 Mg Tablet, 750 MG PO DAILY, (Reported)


   FILLED 5/16/20 FOR 14 DAYS 


Magnesium Oxide (Magnesium Oxide) 400 Mg Tablet, 400 MG PO DAILY, (Reported)


Multivitamins (Thera M Plus Tablet) 1 Each Tablet, 1 TAB PO DAILY, (Reported)





Scheduled PRN


Hydroxyzine HCl (Hydroxyzine HCl) 25 Mg Tablet, 25 MG PO TID PRN for ANXIETY, 

(Reported)





Allergies


Coded Allergies:  


     No Known Allergies (Unverified , 7/6/19)











COREY ROSS MD              May 22, 2020 16:17

## 2020-05-23 NOTE — MHCR
DATE OF CONSULTATION:  05/21/2020

 

Consultation done via telepsychiatry on 05/21/2020.

 

HISTORY OF PRESENT ILLNESS:  I was asked to see this 30-year-old man who was

brought to the emergency room by police because it was found that he was waving a

machete around in what appeared to be a confused state.  In the emergency room,

the patient admitted to recent Cindy use.  The patient was a bit groggy, but he

insisted that he had no intentions of hurting anybody or himself.  The patient

denied having any mood symptoms/problems.  I did not elicit this patient having

any psychotic symptoms either.

 

PAST PSYCHIATRIC HISTORY:  This patient has no history of any prior psychiatric

treatment.  He has never had any hospitalizations.

 

He has no history of any suicidal attempts.

 

FAMILY HISTORY:  He denied any psychiatric illness in the family.

 

MEDICAL HISTORY:  The patient does have significant medical problems.

Apparently, the patient had been admitted to [facility] on

05/02/2020 and found to have an abscess and discitis.  He was transferred to

[place] and then from there, he was transferred to a rehabilitation center and

he left there against medical advice (AMA) on 05/15/2020.  He is readmitted now

for further medical treatment.

 

SUBSTANCE ABUSE PROBLEMS:  This patient has a significant history of abusing

Cindy and intravenous (IV) drug abuse of opiates.  The patient does not seem to

have much insight of this being a problem for him and has no interest in seeking

treatment.

 

MENTAL STATUS EXAMINATION:  As I said, he did appear to be somewhat groggy but,

with much encouragement, he would become alert and he did answer questions with

short sentences, but his answers were appropriate.  His eye contact was fair.

There is no formal thought disorder noted.  His mood is okay.  Affect is flat.  I

did not elicit any psychotic symptoms.  He denied suicidal, homicidal ideation.

Concentration is fair.  Memory appears to be grossly intact.  Insight and

judgment appear to be okay, except that he does not have much insight about his

substance abuse.

 

DIAGNOSES:

Opioid use disorder, severe.

Stimulant use disorder, severe (methamphetamine).

 

TREATMENT RECOMMENDATION:  At this point, the patient says that he has no

suicidal, homicidal ideations.  I am not eliciting any mood symptoms.  He does

have a significant problem with substance abuse, and I did recommend to the

patient that he go ahead and go to treatment for addictions.

## 2020-06-07 ENCOUNTER — HOSPITAL ENCOUNTER (INPATIENT)
Dept: HOSPITAL 53 - M ED | Age: 30
LOS: 9 days | Discharge: LEFT BEFORE DISCHARGE | DRG: 347 | End: 2020-06-16
Attending: INTERNAL MEDICINE | Admitting: INTERNAL MEDICINE
Payer: COMMERCIAL

## 2020-06-07 VITALS — SYSTOLIC BLOOD PRESSURE: 152 MMHG | DIASTOLIC BLOOD PRESSURE: 83 MMHG

## 2020-06-07 VITALS — DIASTOLIC BLOOD PRESSURE: 83 MMHG | SYSTOLIC BLOOD PRESSURE: 152 MMHG

## 2020-06-07 VITALS — SYSTOLIC BLOOD PRESSURE: 145 MMHG | DIASTOLIC BLOOD PRESSURE: 83 MMHG

## 2020-06-07 VITALS — HEIGHT: 73 IN | BODY MASS INDEX: 24.22 KG/M2 | WEIGHT: 182.76 LBS

## 2020-06-07 DIAGNOSIS — D64.9: ICD-10-CM

## 2020-06-07 DIAGNOSIS — E86.0: ICD-10-CM

## 2020-06-07 DIAGNOSIS — M46.44: Primary | ICD-10-CM

## 2020-06-07 DIAGNOSIS — G47.00: ICD-10-CM

## 2020-06-07 DIAGNOSIS — E87.1: ICD-10-CM

## 2020-06-07 DIAGNOSIS — F15.10: ICD-10-CM

## 2020-06-07 DIAGNOSIS — E83.42: ICD-10-CM

## 2020-06-07 DIAGNOSIS — N17.9: ICD-10-CM

## 2020-06-07 DIAGNOSIS — B18.2: ICD-10-CM

## 2020-06-07 DIAGNOSIS — R07.81: ICD-10-CM

## 2020-06-07 DIAGNOSIS — Z11.59: ICD-10-CM

## 2020-06-07 DIAGNOSIS — B96.89: ICD-10-CM

## 2020-06-07 DIAGNOSIS — B95.61: ICD-10-CM

## 2020-06-07 DIAGNOSIS — F11.10: ICD-10-CM

## 2020-06-07 DIAGNOSIS — F14.10: ICD-10-CM

## 2020-06-07 LAB
ALBUMIN SERPL BCG-MCNC: 3.3 GM/DL (ref 3.2–5.2)
ALT SERPL W P-5'-P-CCNC: 39 U/L (ref 12–78)
AMORPH SED URNS QL MICRO: (no result)
BACTERIA URNS QL MICRO: (no result)
BASOPHILS # BLD AUTO: 0 10^3/UL (ref 0–0.2)
BASOPHILS NFR BLD AUTO: 0.3 % (ref 0–1)
BILIRUB CONJ SERPL-MCNC: 0.3 MG/DL (ref 0–0.2)
BILIRUB SERPL-MCNC: 0.6 MG/DL (ref 0.2–1)
BILIRUB UR QL STRIP: NEGATIVE
BUN SERPL-MCNC: 25 MG/DL (ref 7–18)
CALCIUM SERPL-MCNC: 9.2 MG/DL (ref 8.5–10.1)
CHLORIDE SERPL-SCNC: 94 MEQ/L (ref 98–107)
CK MB CFR.DF SERPL CALC: 3.16
CK MB SERPL-MCNC: 1.8 NG/ML (ref ?–3.6)
CK SERPL-CCNC: 57 U/L (ref 39–308)
CO2 SERPL-SCNC: 29 MEQ/L (ref 21–32)
CREAT SERPL-MCNC: 1.55 MG/DL (ref 0.7–1.3)
CRP SERPL-MCNC: 17.2 MG/DL (ref 0–0.3)
EOSINOPHIL # BLD AUTO: 0.1 10^3/UL (ref 0–0.5)
EOSINOPHIL NFR BLD AUTO: 0.5 % (ref 0–3)
ERYTHROCYTE [SEDIMENTATION RATE] IN BLOOD BY WESTERGREN METHOD: 92 MM/HR (ref 0–15)
GFR SERPL CREATININE-BSD FRML MDRD: 56.7 ML/MIN/{1.73_M2} (ref 60–?)
GLUCOSE SERPL-MCNC: 126 MG/DL (ref 70–100)
GLUCOSE UR STRIP-MCNC: NEGATIVE MG/DL
GRAN CASTS URNS QL MICRO: (no result) /LPF
HCT VFR BLD AUTO: 34.2 % (ref 42–52)
HGB BLD-MCNC: 11 G/DL (ref 13.5–17.5)
HYALINE CASTS URNS QL MICRO: (no result) /LPF (ref 0–1)
KETONES UR QL STRIP: NEGATIVE MG/DL
LIPASE SERPL-CCNC: 51 U/L (ref 73–393)
LYMPHOCYTES # BLD AUTO: 1.8 10^3/UL (ref 1.5–5)
LYMPHOCYTES NFR BLD AUTO: 15.2 % (ref 24–44)
MCH RBC QN AUTO: 27.9 PG (ref 27–33)
MCHC RBC AUTO-ENTMCNC: 32.2 G/DL (ref 32–36.5)
MCV RBC AUTO: 86.8 FL (ref 80–96)
MONOCYTES # BLD AUTO: 1.6 10^3/UL (ref 0–0.8)
MONOCYTES NFR BLD AUTO: 13.1 % (ref 0–5)
MUCOUS THREADS URNS QL MICRO: (no result)
NEUTROPHILS # BLD AUTO: 8.3 10^3/UL (ref 1.5–8.5)
NEUTROPHILS NFR BLD AUTO: 70.5 % (ref 36–66)
PLATELET # BLD AUTO: 252 10^3/UL (ref 150–450)
POTASSIUM SERPL-SCNC: 4 MEQ/L (ref 3.5–5.1)
PROT SERPL-MCNC: 8 GM/DL (ref 6.4–8.2)
RBC # BLD AUTO: 3.94 10^6/UL (ref 4.3–6.1)
RBC #/AREA URNS HPF: (no result) /HPF (ref 0–3)
SODIUM SERPL-SCNC: 131 MEQ/L (ref 136–145)
SQUAMOUS URNS QL MICRO: (no result) /HPF
TROPONIN I SERPL-MCNC: < 0.02 NG/ML (ref ?–0.1)
UROBILINOGEN UR QL STRIP: (no result) MG/DL
WBC # BLD AUTO: 11.8 10^3/UL (ref 4–10)

## 2020-06-07 RX ADMIN — MORPHINE SULFATE PRN MG: 4 INJECTION INTRAVENOUS at 16:33

## 2020-06-07 RX ADMIN — MORPHINE SULFATE PRN MG: 4 INJECTION INTRAVENOUS at 21:33

## 2020-06-07 RX ADMIN — SODIUM CHLORIDE SCH UNITS: 4.5 INJECTION, SOLUTION INTRAVENOUS at 21:32

## 2020-06-07 RX ADMIN — CEFEPIME HYDROCHLORIDE SCH MLS/HR: 2 INJECTION, POWDER, FOR SOLUTION INTRAVENOUS at 17:21

## 2020-06-07 RX ADMIN — DEXTROSE MONOHYDRATE SCH MLS/HR: 50 INJECTION, SOLUTION INTRAVENOUS at 21:32

## 2020-06-07 RX ADMIN — SODIUM CHLORIDE SCH MLS/HR: 9 INJECTION, SOLUTION INTRAVENOUS at 16:32

## 2020-06-07 NOTE — ECGEPIP
[facility] - ED

                                       

                                       Test Date:    2020

Pat Name:     ANAT CALDERA               Department:   

Patient ID:   [id number]                 Room:         -

Gender:       Male                     Technician:   bartolo

:          1990               Requested By: CLAUDIA LEMA PA-C

Order Number: [id number]     Reading MD:   Maja Lundborg-Gray

                                 Measurements

Intervals                              Axis          

Rate:         95                       P:            20

NV:           142                      QRS:          69

QRSD:         100                      T:            40

QT:           331                                    

QTc:          417                                    

                           Interpretive Statements

SINUS RHYTHM

NONSPECIFIC ST T WAVE CHANGES

 

 20 RATE INCREASED

NONSPECIFIC ST T WAVE CHANGES

Electronically Signed on 2020 12:24:34 EDT by Maja Lundborg-Gray

## 2020-06-07 NOTE — HPEPDOC
General


Date of Admission


6/7/2020


Date of Service:  Jun 7, 2020


Chief Complaint


The patient is a 30-year-old male who presented to the hospital after 

experiencing back pain and wanted to get his life back together





History of Present Illness


Patient is a 30-year-old  male with a PMHx of Substance abuse (with 

Intravenous Methamphetamines and Opiates), Hepatitis C, Anemia, who was recently

found to have Osteomyelitis / Diskitis of T8-T9.





Patient had originally presented to [facility] on 5/2/20 with 

complaints of back pain. Imaging completed via MRI head revealed evidence of 

phlegmon/abscess at the posterior aspect of T8/T9 extending to the right with 

narrowing of the right lateral recess and compression of the thecal sac. Patient

was subsequently transferred to [facility] for further management. There, he 

received an IR guided biopsy on 5/4, which had revealed acute and chronic 

osteomyelitis. Orthopedic surgery was consult and had not recommended any 

surgery, but did recommended TLSO brace. An echocardiogram was performed and was

negative for any evidence of endocarditis. Blood cultures were positive for 

Enterobacter cloacae. Infectious disease had recommended cefepime 2 g every 8 

for total of 6 weeks duration, starting 5/6.





Patient was ultimately discharged from [facility] and transferred to 

[facility] for drug rehabilitation on 5/7. Patient had ultimately left [place] on 5/15

because he had reported that his girlfriend had left him.





Patient was again admitted to the hospital on 5/21 at [facility] 

for continued management of his discitis/osteomyelitis, after he was brought in 

for psychosis and possible suicidal/homicidal ideation when he was found with a 

machete. However, patient had subsequently left AGAINST MEDICAL ADVICE on 5/22





Patient is again at the hospital because he wants to get his life back together.

Reports that he has had social issues that have made him feel AGAINST MEDICAL 

ADVICE including drug use and ensuring that his belongings arent stolen.





Currently patient reports back pain reported as a 10/10, sharp, continuous with 

periods of worsening. Patient denies any fevers but does report chills. Patient 

does not experience any incontinence of urine and stool or experience any lower 

extremity weakness. Patient is able to ambulate and had walked to the emergency 

room.





Denies any nausea, vomiting, constipation, diarrhea, or urinary discomfort.. He 

does report some left upper abdominal pain / left lower chest pain. Patient 

reports that at baseline to 1 or 2, and occurs intermittently with movement. 

Denies any shortness of breath, chest pain, diffusely, or shortness of 

breath/cough.





Patient reports his appetite is poor and his weight fluctuates.





Home Medications


No Active Prescriptions or Reported Meds





Allergies


Coded Allergies:  


     No Known Allergies (Unverified , 7/6/19)





Past Medical History


Medical History


Substance abuse (with Intravenous Methamphetamines and Opiates), Hepatitis C, 

Anemia


Surgical History


Patient has not expense any prior surgeries; however, has a biopsy of his 

thoracic spine completed on 5/4/20





Family History


- No history of malignancies





Social History


- Denies the use of alcohol or tobacco; patient reports he is an active drug 

user with IV amphetamines and heroin 


- Denies recent travel or sick contacts


- Lives with alone / [place]


- Occupation; unemployed





Review of Systems


Other systems


10 point review of systems complete, all negative otherwise stated in HPI





Vital Signs


- Vitals: /51, HR 89, RR 20, Sat 100%RA, Temp 100.0F


- General: Lying in bed, Reports back pain, AAOx3


- HEENT: NC, AT, PERRLA, EOMI


- CVS: RRR, +S1S2, No appreciable murmurs 


- Lungs: Fair air entry bilaterally, No appreciable wheezing / rales / rhonchi 


- Chest wall: Left lower chest with tenderness on palpation


- Abdomen: Soft, Non-distended, Non-tender


- Extremities: No lower extremity edema, No calf tenderness


- Neuro: No focal motor or sensory deficit


- Skin: No visible rashes





Laboratory Data


Labs 24H


Laboratory Tests 2


6/7/20 09:34: 


Immature Granulocyte % (Auto) 0.4, Neutrophils (%) (Auto) 70.5H, Lymphocytes (%)

(Auto) 15.2L, Monocytes (%) (Auto) 13.1H, Eosinophils (%) (Auto) 0.5, Basophils 

(%) (Auto) 0.3, Neutrophils # (Auto) 8.3, Lymphocytes # (Auto) 1.8, Monocytes # 

(Auto) 1.6H, Eosinophils # (Auto) 0.1, Basophils # (Auto) 0.0, Nucleated Red 

Blood Cells % (auto) 0.0, Erythrocyte Sedimentation Rate 92H, Anion Gap 8, 

Glomerular Filtration Rate 56.7L, Calcium Level 9.2, Total Bilirubin 0.6, Direct

Bilirubin 0.3H, Aspartate Amino Transf (AST/SGOT) 18, Alanine Aminotransferase 

(ALT/SGPT) 39, Alkaline Phosphatase 113, Total Creatine Kinase 57, Creatine 

Kinase MB 1.8, Creatine Kinase MB Relative Index 3.16, Troponin I < 0.02, C-

Reactive Protein, Quantitative 17.20H, Total Protein 8.0, Albumin 3.3, 

Albumin/Globulin Ratio 0.7, Lipase 51L


6/7/20 09:36: Lactic Acid Level 1.2


6/7/20 11:17: 


Urine Color (FISH) YELLOW, Urine Appearance (FISH) CLOUDYH, Urine pH (FISH) 5.0, 

Urine Specific Gravity (FISH) 1.021, Urine Protein 2+H, Bedside Urine Glucose 

(UA) NEGATIVE, Bedside Urine Ketones (LAB) NEGATIVE, Bedside Urine Blood TRACEH,

Bedside Urine Nitrite (LAB) NEGATIVE, Bedside Urine Bilirubin (LAB) NEGATIVE, 

Bedside Urine Urobilinogen (LAB) 4 [facility], Bedside Urine Leukocyte Esterase (L 

TRACEH, Urine Sediment Examination PERFORMED, Urine RBC 3-5H, Urine WBC 5-7H, 

Urine Squamous Epithelial Cells SMALL AMOUNT, Urine Amorphous Sediment MOD 

AMOUNTH, Urine Bacteria SMALL AMOUNTH, Urine Hyaline Casts 3-5H, Urine Granular 

Casts 1-3H, Urine Mucus SMALL AMOUNTH


CBC/BMP


Laboratory Tests


6/7/20 09:34








Microbiology





Microbiology


6/7/20 Urine Culture, Received


         Pending


6/7/20 Blood Culture, Received


         Pending


6/7/20 Blood Culture, Received


         Pending





Plan / VTE


VTE Prophylaxis Ordered?:  Yes





Plan


Plan


Acute on Chronic osteomyelitis of T8/9 - 2/2 Enterobacter cloacae - likely 2/2 

IV drug abuse


- Patient presented to the hospital after leaving [place] on 5/22


- Patient has a recent diagnosis of osteomyelitis/discitis of T8-T9, diagnosed 

at [facility] via Biopsy


- Initial culture results were positive for Enterobacter cloacae; 

recommendations at that time were cefepime 2g q8h


- Patient is hemodynamically stable and afebrile


- Currently patient does not have any focal neurologic deficits; no urinary 

incontinence or bowel incontinence


- Leukocytosis, no lactic acidosis


- Elevated ESR and CRP


- MRI 6/7: Severely limited by motion artifact.  Essentially unchanged from 5 /2/2020.  Osteo/Diskitis at the T8/9 level with surrounding inflammatory changes

and phlegmanous changes extending cranially towards T5/6 and caudally to T10/11.

 No definite abscess formation.  


- MRI findings were discussed between ER provider and orthopedic surgery, Dr. Horta; recommendations were no surgical intervention required at this time


- Will check blood cultures, echocardiogram, MRSA screen





Left lower chest tenderness 


- Patient reports that he experiences left lower chest pain upon movement


- Denies any recent trauma


- There is tenderness to palpation of left lower rib margin


- Will check rib x-ray





Acute kidney injury


- Likely secondary to dehydration


- Avoid nephrotoxic medications 


- Will check urine osmolality and urine electrolytes 


- Will continue with IV fluid hydration





Hyponatremia - possibly 2/2 hypotonic hypovolemic etiology 


- Mild 


- Will continue with IV fluid hydration





Substance abuse 


- Hx of Intravenous Methamphetamines and Opiates


- HIV / Syphilis screen negative on 5/22 





Hepatitis C


- Liver function is within normal limits





Anemia


- Hg appears to be at similar level compared to early May


- No evidence of bleeding 


- Will continue to monitor 





DVT prophylaxis 


- Will start Heparin SQ











KRISTA MCMAHON MD                 Jun 7, 2020 15:16

## 2020-06-08 VITALS — DIASTOLIC BLOOD PRESSURE: 83 MMHG | SYSTOLIC BLOOD PRESSURE: 145 MMHG

## 2020-06-08 VITALS — SYSTOLIC BLOOD PRESSURE: 120 MMHG | DIASTOLIC BLOOD PRESSURE: 65 MMHG

## 2020-06-08 VITALS — SYSTOLIC BLOOD PRESSURE: 150 MMHG | DIASTOLIC BLOOD PRESSURE: 86 MMHG

## 2020-06-08 VITALS — SYSTOLIC BLOOD PRESSURE: 114 MMHG | DIASTOLIC BLOOD PRESSURE: 58 MMHG

## 2020-06-08 VITALS — DIASTOLIC BLOOD PRESSURE: 60 MMHG | SYSTOLIC BLOOD PRESSURE: 116 MMHG

## 2020-06-08 LAB
BASOPHILS # BLD AUTO: 0 10^3/UL (ref 0–0.2)
BASOPHILS NFR BLD AUTO: 0.4 % (ref 0–1)
BUN SERPL-MCNC: 10 MG/DL (ref 7–18)
CALCIUM SERPL-MCNC: 8.5 MG/DL (ref 8.5–10.1)
CHLORIDE SERPL-SCNC: 97 MEQ/L (ref 98–107)
CO2 SERPL-SCNC: 27 MEQ/L (ref 21–32)
CREAT SERPL-MCNC: 0.64 MG/DL (ref 0.7–1.3)
EOSINOPHIL # BLD AUTO: 0.1 10^3/UL (ref 0–0.5)
EOSINOPHIL NFR BLD AUTO: 0.9 % (ref 0–3)
GFR SERPL CREATININE-BSD FRML MDRD: > 60 ML/MIN/{1.73_M2} (ref 60–?)
GLUCOSE SERPL-MCNC: 111 MG/DL (ref 70–100)
HCT VFR BLD AUTO: 31.6 % (ref 42–52)
HGB BLD-MCNC: 10.5 G/DL (ref 13.5–17.5)
LYMPHOCYTES # BLD AUTO: 1.3 10^3/UL (ref 1.5–5)
LYMPHOCYTES NFR BLD AUTO: 13.6 % (ref 24–44)
MAGNESIUM SERPL-MCNC: 1.6 MG/DL (ref 1.8–2.4)
MCH RBC QN AUTO: 27.9 PG (ref 27–33)
MCHC RBC AUTO-ENTMCNC: 33.2 G/DL (ref 32–36.5)
MCV RBC AUTO: 84 FL (ref 80–96)
MONOCYTES # BLD AUTO: 1.3 10^3/UL (ref 0–0.8)
MONOCYTES NFR BLD AUTO: 13 % (ref 0–5)
NEUTROPHILS # BLD AUTO: 6.9 10^3/UL (ref 1.5–8.5)
NEUTROPHILS NFR BLD AUTO: 71.6 % (ref 36–66)
PLATELET # BLD AUTO: 211 10^3/UL (ref 150–450)
POTASSIUM SERPL-SCNC: 4 MEQ/L (ref 3.5–5.1)
RBC # BLD AUTO: 3.76 10^6/UL (ref 4.3–6.1)
SODIUM SERPL-SCNC: 132 MEQ/L (ref 136–145)
WBC # BLD AUTO: 9.7 10^3/UL (ref 4–10)

## 2020-06-08 RX ADMIN — MORPHINE SULFATE PRN MG: 4 INJECTION INTRAVENOUS at 02:12

## 2020-06-08 RX ADMIN — CEFEPIME HYDROCHLORIDE SCH MLS/HR: 2 INJECTION, POWDER, FOR SOLUTION INTRAVENOUS at 17:10

## 2020-06-08 RX ADMIN — CEFEPIME HYDROCHLORIDE SCH MLS/HR: 2 INJECTION, POWDER, FOR SOLUTION INTRAVENOUS at 10:47

## 2020-06-08 RX ADMIN — DEXTROSE MONOHYDRATE SCH MLS/HR: 50 INJECTION, SOLUTION INTRAVENOUS at 04:19

## 2020-06-08 RX ADMIN — SODIUM CHLORIDE SCH MLS/HR: 9 INJECTION, SOLUTION INTRAVENOUS at 20:15

## 2020-06-08 RX ADMIN — KETOROLAC TROMETHAMINE SCH MG: 30 INJECTION, SOLUTION INTRAMUSCULAR at 18:36

## 2020-06-08 RX ADMIN — DEXTROSE MONOHYDRATE SCH MLS/HR: 50 INJECTION, SOLUTION INTRAVENOUS at 12:01

## 2020-06-08 RX ADMIN — SODIUM CHLORIDE SCH MLS/HR: 9 INJECTION, SOLUTION INTRAVENOUS at 02:12

## 2020-06-08 RX ADMIN — SODIUM CHLORIDE SCH MLS/HR: 9 INJECTION, SOLUTION INTRAVENOUS at 10:49

## 2020-06-08 RX ADMIN — SODIUM CHLORIDE SCH UNITS: 4.5 INJECTION, SOLUTION INTRAVENOUS at 13:52

## 2020-06-08 RX ADMIN — CEFEPIME HYDROCHLORIDE SCH MLS/HR: 2 INJECTION, POWDER, FOR SOLUTION INTRAVENOUS at 02:12

## 2020-06-08 RX ADMIN — SODIUM CHLORIDE SCH UNITS: 4.5 INJECTION, SOLUTION INTRAVENOUS at 20:15

## 2020-06-08 RX ADMIN — SODIUM CHLORIDE SCH UNITS: 4.5 INJECTION, SOLUTION INTRAVENOUS at 06:00

## 2020-06-08 NOTE — ECHO
DATE OF STUDY:  06/07/2020

REFERRING PHYSICIAN:  YUNIOR Saldaña

INDICATION:  Sepsis.

HEIGHT:  185 cm.

WEIGHT:  83 kg.

 

2-D MEASUREMENTS:

Aortic root:  3.7 cm

Left atrium:  2.6 cm

Ventricular septum:  1.02 cm

Posterior wall:  0.99 cm

Aortic annulus:  2.9 cm

 

DOPPLER MEASUREMENTS:

No aortic regurgitation

No aortic stenosis

Aortic valve velocity:  151 cm/sec

LVOT velocity:  144 cm/sec

LVOT VTI:  23.7 cm

Mitral E velocity:  99.5 cm/sec

Mitral E deceleration time:  176 ms

No mitral regurgitation

No mitral stenosis

Trace tricuspid regurgitation

No pulmonic regurgitation

Pulmonary artery acceleration time:  120 ms

 

MITRAL ANNULAR TISSUE DOPPLER:

E prime septal:  12.4 cm/sec

E prime lateral:  11.1 cm/sec

 

DESCRIPTION:  The rhythm was sinus.  Image quality was excellent.  This was a

2-D, M-mode, color flow Doppler and pulse wave Doppler examination and included

mitral annular tissue Doppler.

 

CONCLUSIONS:

1.  Normal echocardiogram Doppler.

2.  No vegetations seen on any of the cardiac valves.  All of the cardiac valves

were structurally and functionally normal.

3.  Normal left ventricle internal dimensions and wall thickness.  Normal

regional LV wall motion and wall thickening.  Normal LV systolic and diastolic

function.  LVEF 65% by visual estimate.

 

The results of this study were communicated by telephone to the emergency room

physician, Dr. Garret Reed.

## 2020-06-08 NOTE — CR
DATE OF CONSULTATION:  06/08/2020

 

INFECTIOUS DISEASE CONSULTATION

 

Asked to consult by hospitalist for evaluation of Gram-positive bacteremia and

history of discitis of T8-T9 due to Enterobacter cloacae.

 

HISTORY OF PRESENT ILLNESS:  Emigdio is a 30-year-old gentleman who is known to have

a history of vertebral osteomyelitis of T8-T9 with Enterobacter cloacae,

diagnosed on May 2, 2020 at [facility].  The patient was

transferred to [facility] where he had a biopsy under interventional radiology, which

was positive for acute on chronic osteomyelitis with culture positive for

Enterobacter cloacae.  The patient was supposed to be treated with IV cefepime

for 6 weeks, but he left against medical advice, the rehab center where he was in

[facility].  He was supposed to continue with oral Levaquin, which he

did not take over the past couple of weeks.  The initial recommendation from

infectious disease (ID) in [facility], was cefepime 2 grams every 8 hours for total

of 6 weeks starting May 6, 2020.  The patient came to our hospital also on May

21, 2020 for psychosis and IV drug use.  He was found to have a   machete. The

patient left again against medical advice.  This time he comes back with a

complaint of severe back pain, continues using IV drugs, and has his two

suitcases stating that he will remain here to get his life together and to get

his IV antibiotics.  He states he has a low grade fever with chills, worsening

back pain 10 over 10, especially with a deep breath.  He has a new pleuritic

chest pain on the left side of the chest.  He has no urinary or stool

incontinence.  No lower extremity weakness.  He has no nausea, vomiting,

diarrhea, constipation.

 

ALLERGIES:  He has no known drug allergies.

 

MEDICATIONS:  He was supposed to be on Levaquin, which he has not been taking.

 

PAST MEDICAL HISTORY:

Substance abuse, especially IV methamphetamines and opiates.

Chronic hepatitis C.

Vertebral osteomyelitis T8-T9 with Enterobacter cloacae.

 

SURGICAL HISTORY:

Interventional radiology (IR) biopsy of thoracic spine.

 

FAMILY HISTORY:  Not been revealing.

 

SOCIAL HISTORY:  He lives with a friend.  He is on and off with his girlfriend.

He is an IV drug user, active, with amphetamines and heroin.  He is homeless.

 

REVIEW OF SYSTEMS:  No nausea, vomiting or diarrhea.  No upper or lower extremity

weakness.  No headache or neck stiffness, mostly mid-thoracic back pain,

pleuritic chest pain.

 

LABORATORY DATA:  White count yesterday was 18.8, today 9.7, hemoglobin 10.5,

hematocrit 31.6, platelets 211, 72% neutrophils, 13% lymphocytes, 13% monocytes.

ESR 92 up from 52 on May 22, 2020.  Sodium 132, potassium 4, chloride 97,

bicarbonate 27, BUN 10, creatinine 0.64, glucose 111.  Osmolality 275, calcium

8.5, magnesium 1.6, CRP 17.2 up from 8.04 last admission.

 

Blood cultures, two sets, are positive for gram-positive cocci in pairs, chains

and clusters, two sets, one minute apart.  Urine culture was negative.

 

MRI of the thoracic spine showed persistent osteomyelitis of T8-T9 extending down

to T11 caudally and T5-T6 cranially.

 

On physical exam, he is a young gentleman in no acute distress, laying on his

side.  Temperature is 97.2, pulse 89, respirations 18, blood pressure 116/60,

oxygen saturation (O2 sat) 96% on room air.  Heart:  Normal S1, S2.  No murmurs,

rubs or gallops.  Lungs:  Clear.  No wheezes, rales or rhonchi.  Abdomen:  Soft,

nontender.  No hepatosplenomegaly.  Back:  No costovertebral angle (CVA)

tenderness.  He has lumbosacral tenderness, around T8, T9 and paraspinal

tenderness most on the left side.  Extremities:  No clubbing, cyanosis or edema.

No calf tenderness.  Neurologic:  Exam:  Normal.  Upper and lower extremity

strength normal.  Skin:  Multiple tattoos.  No rashes.  He has a scarred up right

antecubital vein where he injects IV drugs, but there is no evidence of abscesses

of his skin suggesting a portal of entry.

 

IMPRESSION:  30-year-old gentleman with previous history of T8-T9 vertebral

discitis since diagnosed on May 2, 2020 with culture positive for Enterobacter

cloacae, noncompliant, has not been taking his medication, now admitted with

recurrent bacteremia with what sounds like a gram-positive coccus, possibly strep

or staph aureus, persistent IV drug use, being treated with IV vancomycin.

Echocardiogram, transthoracic, done on June 7, 2020, read by Dr. Barnett showed

no evidence of endocarditis, no mitral regurgitation, no pulmonic regurgitation,

no aortic stenosis.   No vegetations are seen on the cardiac valves and ejection

fraction is 65%.

 

ALLERGIES:  No known drug allergies.

 

MEDICATIONS:

- vancomycin 1 gram IV every 8 hours

- cefepime 2 grams IV every 8 hours

- lorazepam 1 mg IV every 6 hours as need for agitation

- morphine 4 mg IV as needed for pain

 

PLAN:

Continue with current IV antibiotics, vancomycin and cefepime.  Vancomycin to

cover for gram-positive bacteremia and cefepime for Enterobacter aerogenes.

Pending results of blood culture, will decide whether the patient needs a

transesophageal echocardiogram.  He will need IV cefepime for at least 4-6 weeks

since he has had worsening thoracic discitis as he has not been compliant with

his oral medication.  He plans to remain for the course of treatment.

 

History of drug abuse:  Opiates and amphetamines; makes it very challenging case.

He left against medical advice (AMA) twice before.

 

Chronic hepatitis C.  This will be addressed at a later date as an outpatient if

patient complies with treatment.

## 2020-06-08 NOTE — REP
TWO-VIEW CHEST:

 

REASON FOR EXAM:  Chest discomfort.

 

COMPARISON:  No priors.

 

FINDINGS:  The superior mediastinal structures are midline.  The cardiac

silhouette is unremarkable in size, shape, and position.  The diaphragmatic

surfaces of the lungs are regular, and the costophrenic angles are clear.  The

pulmonary fields are clear.  The imaged osseous structures are intact.

 

IMPRESSION:

 

There is no acute cardiopulmonary disease.

 

Preliminary report given by Dr. Garsia.

 

 

Electronically Signed by

Hung Craig DO 06/08/2020 03:15 P

## 2020-06-08 NOTE — REP
REASON FOR EXAM:  Chest wall tenderness bilaterally.

 

Preliminary given by Dr. Garsia.

 

Four views were obtained on both right and left sides. Not all of the ribs are

included on all of the views.

 

This limited examination shows no evidence of a fracture.

 

 

Electronically Signed by

Hung Craig DO 06/08/2020 03:18 P

## 2020-06-08 NOTE — REPVR
PROCEDURE INFORMATION: 

Exam: CT Chest With Contrast 

Exam date and time: 6/8/2020 5:50 PM 

Age: 30 years old 

Clinical indication: Chest pain; On breathing; Additional info: Fever pleuritic 

cchest pain ivdu 



TECHNIQUE: 

Imaging protocol: Computed tomography of the chest with intravenous contrast. 

3D rendering: MIP and/or 3D reconstructed images were created by the 

technologist. 

Radiation optimization: All CT scans at this facility use at least one of these 

dose optimization techniques: automated exposure control; mA and/or kV 

adjustment per patient size (includes targeted exams where dose is matched to 

clinical indication); or iterative reconstruction. 

Contrast material: ISOVUE 370; Contrast volume: 100 ml; Contrast route: IV;  



COMPARISON: 

CR RIBS-BILAT W-O PA CHEST 6/7/2020 4:14 PM 



FINDINGS: 

Lungs: Bibasilar atelectasis. No acute pulmonary infiltrates demonstrated. 

Pleural space: Unremarkable. No pneumothorax. No pleural effusion. 

Heart: Unremarkable. No cardiomegaly. No pericardial effusion. 

Aorta: Unremarkable. No aortic aneurysm. 

Lymph nodes: Unremarkable. No enlarged lymph nodes. 



Spleen: There is mild splenomegaly with a maximum span of 14 centimeters. No 

focal abnormalities demonstrated. 

Bones/joints: Irregularity and widening of the disc space at T8 and T9 

associated with marked thickening in the adjacent paraspinal soft tissues 

extending from T6 to T10 for 7.4 cm as measured superior to inferior. Findings 

consistent with infectious discitis. Osteomyelitis is not excluded. 

Soft tissues: Unremarkable. 



IMPRESSION: 

1. Irregularity and widening of the disc space at T8 and T9 associated with 

marked thickening in the adjacent paraspinal soft tissues extending from T6 to 

T10 for 7.4 cm as measured superior to inferior. Findings consistent with 

infectious discitis. Osteomyelitis is not excluded. 

2. There is mild splenomegaly with a maximum span of 14 centimeters. No focal 

abnormalities demonstrated. 



Electronically signed by: Farhad Andrew On 06/08/2020  18:11:52 PM

## 2020-06-09 VITALS — SYSTOLIC BLOOD PRESSURE: 116 MMHG | DIASTOLIC BLOOD PRESSURE: 59 MMHG

## 2020-06-09 VITALS — SYSTOLIC BLOOD PRESSURE: 147 MMHG | DIASTOLIC BLOOD PRESSURE: 77 MMHG

## 2020-06-09 VITALS — DIASTOLIC BLOOD PRESSURE: 73 MMHG | SYSTOLIC BLOOD PRESSURE: 139 MMHG

## 2020-06-09 LAB
BASOPHILS # BLD AUTO: 0.1 10^3/UL (ref 0–0.2)
BASOPHILS NFR BLD AUTO: 0.7 % (ref 0–1)
BUN SERPL-MCNC: 14 MG/DL (ref 7–18)
CALCIUM SERPL-MCNC: 8.2 MG/DL (ref 8.5–10.1)
CHLORIDE SERPL-SCNC: 103 MEQ/L (ref 98–107)
CO2 SERPL-SCNC: 28 MEQ/L (ref 21–32)
CREAT SERPL-MCNC: 0.56 MG/DL (ref 0.7–1.3)
EOSINOPHIL # BLD AUTO: 0.1 10^3/UL (ref 0–0.5)
EOSINOPHIL NFR BLD AUTO: 1.9 % (ref 0–3)
FERRITIN SERPL-MCNC: 337 NG/ML (ref 26–388)
GFR SERPL CREATININE-BSD FRML MDRD: > 60 ML/MIN/{1.73_M2} (ref 60–?)
GLUCOSE SERPL-MCNC: 99 MG/DL (ref 70–100)
HCT VFR BLD AUTO: 32.5 % (ref 42–52)
HGB BLD-MCNC: 10.9 G/DL (ref 13.5–17.5)
IRON SATN MFR SERPL: 12.1 % (ref 19.7–50)
IRON SERPL-MCNC: 27 UG/DL (ref 65–175)
LYMPHOCYTES # BLD AUTO: 1.6 10^3/UL (ref 1.5–5)
LYMPHOCYTES NFR BLD AUTO: 23.9 % (ref 24–44)
MAGNESIUM SERPL-MCNC: 1.9 MG/DL (ref 1.8–2.4)
MCH RBC QN AUTO: 28.3 PG (ref 27–33)
MCHC RBC AUTO-ENTMCNC: 33.5 G/DL (ref 32–36.5)
MCV RBC AUTO: 84.4 FL (ref 80–96)
MONOCYTES # BLD AUTO: 1 10^3/UL (ref 0–0.8)
MONOCYTES NFR BLD AUTO: 14.4 % (ref 0–5)
NEUTROPHILS # BLD AUTO: 4 10^3/UL (ref 1.5–8.5)
NEUTROPHILS NFR BLD AUTO: 58.2 % (ref 36–66)
PLATELET # BLD AUTO: 239 10^3/UL (ref 150–450)
POTASSIUM SERPL-SCNC: 4.3 MEQ/L (ref 3.5–5.1)
RBC # BLD AUTO: 3.85 10^6/UL (ref 4.3–6.1)
SODIUM SERPL-SCNC: 137 MEQ/L (ref 136–145)
TIBC SERPL-MCNC: 223 UG/DL (ref 250–450)
VANCOMYCIN SERPL-MCNC: 6.8 UG/ML
WBC # BLD AUTO: 6.9 10^3/UL (ref 4–10)

## 2020-06-09 RX ADMIN — SODIUM CHLORIDE SCH MLS/HR: 9 INJECTION, SOLUTION INTRAVENOUS at 09:44

## 2020-06-09 RX ADMIN — SODIUM CHLORIDE SCH MLS/HR: 9 INJECTION, SOLUTION INTRAVENOUS at 18:44

## 2020-06-09 RX ADMIN — RAMELTEON SCH MG: 8 TABLET ORAL at 20:35

## 2020-06-09 RX ADMIN — KETOROLAC TROMETHAMINE SCH MG: 30 INJECTION, SOLUTION INTRAMUSCULAR at 02:22

## 2020-06-09 RX ADMIN — KETOROLAC TROMETHAMINE SCH MG: 30 INJECTION, SOLUTION INTRAMUSCULAR at 20:34

## 2020-06-09 RX ADMIN — DEXTROSE MONOHYDRATE SCH MLS/HR: 50 INJECTION, SOLUTION INTRAVENOUS at 17:44

## 2020-06-09 RX ADMIN — SODIUM CHLORIDE SCH MLS/HR: 9 INJECTION, SOLUTION INTRAVENOUS at 10:51

## 2020-06-09 RX ADMIN — CEFEPIME HYDROCHLORIDE SCH MLS/HR: 2 INJECTION, POWDER, FOR SOLUTION INTRAVENOUS at 19:50

## 2020-06-09 RX ADMIN — SODIUM CHLORIDE SCH UNITS: 4.5 INJECTION, SOLUTION INTRAVENOUS at 06:00

## 2020-06-09 RX ADMIN — KETOROLAC TROMETHAMINE SCH MG: 30 INJECTION, SOLUTION INTRAMUSCULAR at 12:14

## 2020-06-09 RX ADMIN — SODIUM CHLORIDE SCH MLS/HR: 9 INJECTION, SOLUTION INTRAVENOUS at 17:30

## 2020-06-09 RX ADMIN — SODIUM CHLORIDE SCH UNITS: 4.5 INJECTION, SOLUTION INTRAVENOUS at 13:34

## 2020-06-09 RX ADMIN — SODIUM CHLORIDE SCH UNITS: 4.5 INJECTION, SOLUTION INTRAVENOUS at 19:52

## 2020-06-09 RX ADMIN — CEFEPIME HYDROCHLORIDE SCH MLS/HR: 2 INJECTION, POWDER, FOR SOLUTION INTRAVENOUS at 02:21

## 2020-06-09 RX ADMIN — CEFEPIME HYDROCHLORIDE SCH MLS/HR: 2 INJECTION, POWDER, FOR SOLUTION INTRAVENOUS at 12:14

## 2020-06-09 NOTE — REP
MRI THORACIC SPINE:  06/07/2020:

 

INDICATION:  Thoracic pain.

 

TECHNIQUE:  Multiplanar short and long TR sequences of the thoracic spine were

obtained, including post gadolinium images.

 

COMPARISON:  05/02/2020

 

FINDINGS:  Image quality of the study is significantly degraded by patient

motion.

 

Sequelae of discitis/osteomyelitis at the level of T8/T9 have essentially

remained stable with adjacent soft tissue inflammatory changes and likely

phlegmon.  No drainable abscess has developed.  Vertebral body height at T8 as

well as T9 is essentially stable.  No abnormal cord signal is detected.  There 
is

no evidence of fluid collection within the spinal canal.

 

IMPRESSION:

Image quality significantly degraded by patient motion.

 

No significant changes detected compared to last month.

 

 

 



MTDD

## 2020-06-10 VITALS — DIASTOLIC BLOOD PRESSURE: 75 MMHG | SYSTOLIC BLOOD PRESSURE: 132 MMHG

## 2020-06-10 VITALS — DIASTOLIC BLOOD PRESSURE: 86 MMHG | SYSTOLIC BLOOD PRESSURE: 130 MMHG

## 2020-06-10 VITALS — SYSTOLIC BLOOD PRESSURE: 115 MMHG | DIASTOLIC BLOOD PRESSURE: 74 MMHG

## 2020-06-10 LAB
BASOPHILS # BLD AUTO: 0.1 10^3/UL (ref 0–0.2)
BASOPHILS NFR BLD AUTO: 0.7 % (ref 0–1)
BUN SERPL-MCNC: 11 MG/DL (ref 7–18)
CALCIUM SERPL-MCNC: 8.5 MG/DL (ref 8.5–10.1)
CHLORIDE SERPL-SCNC: 107 MEQ/L (ref 98–107)
CO2 SERPL-SCNC: 27 MEQ/L (ref 21–32)
CREAT SERPL-MCNC: 0.52 MG/DL (ref 0.7–1.3)
CRP SERPL-MCNC: 5.27 MG/DL (ref 0–0.3)
EOSINOPHIL # BLD AUTO: 0.2 10^3/UL (ref 0–0.5)
EOSINOPHIL NFR BLD AUTO: 2.5 % (ref 0–3)
ERYTHROCYTE [SEDIMENTATION RATE] IN BLOOD BY WESTERGREN METHOD: 75 MM/HR (ref 0–15)
GFR SERPL CREATININE-BSD FRML MDRD: > 60 ML/MIN/{1.73_M2} (ref 60–?)
GLUCOSE SERPL-MCNC: 102 MG/DL (ref 70–100)
HCT VFR BLD AUTO: 31.8 % (ref 42–52)
HGB BLD-MCNC: 10.5 G/DL (ref 13.5–17.5)
LYMPHOCYTES # BLD AUTO: 1.9 10^3/UL (ref 1.5–5)
LYMPHOCYTES NFR BLD AUTO: 21.1 % (ref 24–44)
MAGNESIUM SERPL-MCNC: 1.7 MG/DL (ref 1.8–2.4)
MCH RBC QN AUTO: 28.1 PG (ref 27–33)
MCHC RBC AUTO-ENTMCNC: 33 G/DL (ref 32–36.5)
MCV RBC AUTO: 85 FL (ref 80–96)
MONOCYTES # BLD AUTO: 0.8 10^3/UL (ref 0–0.8)
MONOCYTES NFR BLD AUTO: 8.8 % (ref 0–5)
NEUTROPHILS # BLD AUTO: 5.9 10^3/UL (ref 1.5–8.5)
NEUTROPHILS NFR BLD AUTO: 65.9 % (ref 36–66)
PLATELET # BLD AUTO: 251 10^3/UL (ref 150–450)
POTASSIUM SERPL-SCNC: 4.1 MEQ/L (ref 3.5–5.1)
RBC # BLD AUTO: 3.74 10^6/UL (ref 4.3–6.1)
SODIUM SERPL-SCNC: 140 MEQ/L (ref 136–145)
WBC # BLD AUTO: 8.9 10^3/UL (ref 4–10)

## 2020-06-10 PROCEDURE — B246YZZ ULTRASONOGRAPHY OF RIGHT AND LEFT HEART USING OTHER CONTRAST: ICD-10-PCS | Performed by: INTERNAL MEDICINE

## 2020-06-10 RX ADMIN — SODIUM CHLORIDE SCH UNITS: 4.5 INJECTION, SOLUTION INTRAVENOUS at 12:41

## 2020-06-10 RX ADMIN — CEFEPIME HYDROCHLORIDE SCH MLS/HR: 2 INJECTION, POWDER, FOR SOLUTION INTRAVENOUS at 19:28

## 2020-06-10 RX ADMIN — CEFEPIME HYDROCHLORIDE SCH MLS/HR: 2 INJECTION, POWDER, FOR SOLUTION INTRAVENOUS at 11:53

## 2020-06-10 RX ADMIN — SODIUM CHLORIDE SCH MLS/HR: 9 INJECTION, SOLUTION INTRAVENOUS at 01:01

## 2020-06-10 RX ADMIN — SODIUM CHLORIDE SCH MLS/HR: 9 INJECTION, SOLUTION INTRAVENOUS at 03:09

## 2020-06-10 RX ADMIN — KETOROLAC TROMETHAMINE SCH MG: 30 INJECTION, SOLUTION INTRAMUSCULAR at 21:00

## 2020-06-10 RX ADMIN — MORPHINE SULFATE PRN MG: 4 INJECTION INTRAVENOUS at 05:35

## 2020-06-10 RX ADMIN — SODIUM CHLORIDE SCH UNITS: 4.5 INJECTION, SOLUTION INTRAVENOUS at 21:27

## 2020-06-10 RX ADMIN — RAMELTEON SCH MG: 8 TABLET ORAL at 23:53

## 2020-06-10 RX ADMIN — SODIUM CHLORIDE SCH MLS/HR: 9 INJECTION, SOLUTION INTRAVENOUS at 12:39

## 2020-06-10 RX ADMIN — DEXTROSE MONOHYDRATE SCH MLS/HR: 50 INJECTION, SOLUTION INTRAVENOUS at 01:56

## 2020-06-10 RX ADMIN — MORPHINE SULFATE PRN MG: 4 INJECTION INTRAVENOUS at 00:14

## 2020-06-10 RX ADMIN — KETOROLAC TROMETHAMINE SCH MG: 30 INJECTION, SOLUTION INTRAMUSCULAR at 12:39

## 2020-06-10 RX ADMIN — KETOROLAC TROMETHAMINE SCH MG: 30 INJECTION, SOLUTION INTRAMUSCULAR at 04:50

## 2020-06-10 RX ADMIN — SODIUM CHLORIDE SCH MLS/HR: 9 INJECTION, SOLUTION INTRAVENOUS at 23:53

## 2020-06-10 RX ADMIN — CEFEPIME HYDROCHLORIDE SCH MLS/HR: 2 INJECTION, POWDER, FOR SOLUTION INTRAVENOUS at 04:19

## 2020-06-10 RX ADMIN — SODIUM CHLORIDE SCH UNITS: 4.5 INJECTION, SOLUTION INTRAVENOUS at 04:19

## 2020-06-10 NOTE — IPN
DATE:  06/09/2020

 

Mr. Storm is doing well. He was sleepy this afternoon when I visited him but had

no complaints.  He has been afebrile. He has some midthoracic back pain but

otherwise doing well.

 

LABORATORY DATA:

White count of 6.9, hemoglobin 10.9, hematocrit 32.5, platelets 239, 58%

neutrophils, 24% lymphocytes, 14% monocytes.  ESR 92.

 

Sodium 137, potassium 4.3, chloride 103, bicarbonate 23, BUN 14, creatinine 0.56,

glucose 99, calcium 8.2, magnesium 1.9. Iron 27, TIBC 223, iron saturation 12%,

ferritin 337.

 

Blood cultures are positive for Staphylococcus aureus, two sets. Identification

pending.  Urine culture is negative.  Blood culture from June 9 is pending.

 

PHYSICAL EXAMINATION:

HEART:  Normal S1, S2.  No murmurs, rubs, or gallops.

LUNGS:  Clear.  No wheezes, rales, or rhonchi.

ABDOMEN:  Soft, nontender.  No hepatosplenomegaly.

BACK:  Midthoracic tenderness around T8, T9, T10.

EXTREMITIES:  No clubbing, cyanosis, or edema.  No calf tenderness.  Multiple

tattoos.

 

IMPRESSION:

1.  Discitis, T8-T9, with Enterobacter aerogenes, on intravenous (IV) cefepime.

 

2.  Staphylococcus aureus bacteremia.  Identification and susceptibility of

pathogen is not available yet but suspect probably methicillin-resistant

Staphylococcus aureus (MRSA).  The patient had a transthoracic echocardiogram,

which was negative.  He will need a transesophageal echocardiogram (ARIEL).

 

3.  Pleuritic chest pain.  A chest CT done on 06/08/2020 showed irregularity with

widening of disc space at T8-9 with thickening in the adjacent paraspinal soft

tissue and T6-10, about 7.4 cm superior to inferiorly, consistent with discitis.

There is mild splenomegaly, but there is no pulmonary infiltrate septic emboli.

 

 

4.  Thoracic MRI was reviewed and showed significant degraded images due patient

motion.  No significant changes detected compared to last one done on May 2.

There is no drainable abscess.  There is T8-9 abnormal soft tissue inflammatory

changes and phlegmon, consistent with discitis/osteomyelitis..

 

PLAN:  Schedule transesophageal echocardiogram for Friday to rule out right-sided

versus left-sided endocarditis as a cause of Staphylococcus aureus bacteremia.

Continue cefepime for discitis due to Enterobacter aerogenes.  Continue

vancomycin for Staphylococcus aureus bacteremia.  Consult cardiology for ARIEL on

Thursday or Friday.  The patient will need to remain here for a total of 6 weeks

or at a rehabilitation facility for IV antibiotics.

## 2020-06-11 VITALS — SYSTOLIC BLOOD PRESSURE: 105 MMHG | DIASTOLIC BLOOD PRESSURE: 54 MMHG

## 2020-06-11 VITALS — SYSTOLIC BLOOD PRESSURE: 108 MMHG | DIASTOLIC BLOOD PRESSURE: 47 MMHG

## 2020-06-11 VITALS — SYSTOLIC BLOOD PRESSURE: 114 MMHG | DIASTOLIC BLOOD PRESSURE: 58 MMHG

## 2020-06-11 VITALS — DIASTOLIC BLOOD PRESSURE: 60 MMHG | SYSTOLIC BLOOD PRESSURE: 123 MMHG

## 2020-06-11 VITALS — SYSTOLIC BLOOD PRESSURE: 117 MMHG | DIASTOLIC BLOOD PRESSURE: 57 MMHG

## 2020-06-11 VITALS — SYSTOLIC BLOOD PRESSURE: 110 MMHG | DIASTOLIC BLOOD PRESSURE: 56 MMHG

## 2020-06-11 VITALS — DIASTOLIC BLOOD PRESSURE: 75 MMHG | SYSTOLIC BLOOD PRESSURE: 141 MMHG

## 2020-06-11 VITALS — SYSTOLIC BLOOD PRESSURE: 107 MMHG | DIASTOLIC BLOOD PRESSURE: 55 MMHG

## 2020-06-11 VITALS — SYSTOLIC BLOOD PRESSURE: 121 MMHG | DIASTOLIC BLOOD PRESSURE: 70 MMHG

## 2020-06-11 VITALS — SYSTOLIC BLOOD PRESSURE: 130 MMHG | DIASTOLIC BLOOD PRESSURE: 66 MMHG

## 2020-06-11 LAB
BASOPHILS # BLD AUTO: 0.1 10^3/UL (ref 0–0.2)
BASOPHILS NFR BLD AUTO: 0.9 % (ref 0–1)
BUN SERPL-MCNC: 16 MG/DL (ref 7–18)
CALCIUM SERPL-MCNC: 8.3 MG/DL (ref 8.5–10.1)
CHLORIDE SERPL-SCNC: 106 MEQ/L (ref 98–107)
CO2 SERPL-SCNC: 26 MEQ/L (ref 21–32)
CREAT SERPL-MCNC: 0.62 MG/DL (ref 0.7–1.3)
EOSINOPHIL # BLD AUTO: 0.3 10^3/UL (ref 0–0.5)
EOSINOPHIL NFR BLD AUTO: 2.4 % (ref 0–3)
GFR SERPL CREATININE-BSD FRML MDRD: > 60 ML/MIN/{1.73_M2} (ref 60–?)
GLUCOSE SERPL-MCNC: 137 MG/DL (ref 70–100)
HCT VFR BLD AUTO: 34.7 % (ref 42–52)
HGB BLD-MCNC: 11.2 G/DL (ref 13.5–17.5)
LYMPHOCYTES # BLD AUTO: 2.1 10^3/UL (ref 1.5–5)
LYMPHOCYTES NFR BLD AUTO: 17.9 % (ref 24–44)
MAGNESIUM SERPL-MCNC: 1.7 MG/DL (ref 1.8–2.4)
MCH RBC QN AUTO: 27.5 PG (ref 27–33)
MCHC RBC AUTO-ENTMCNC: 32.3 G/DL (ref 32–36.5)
MCV RBC AUTO: 85 FL (ref 80–96)
MONOCYTES # BLD AUTO: 0.8 10^3/UL (ref 0–0.8)
MONOCYTES NFR BLD AUTO: 6.5 % (ref 0–5)
NEUTROPHILS # BLD AUTO: 8.3 10^3/UL (ref 1.5–8.5)
NEUTROPHILS NFR BLD AUTO: 71.2 % (ref 36–66)
PLATELET # BLD AUTO: 324 10^3/UL (ref 150–450)
POTASSIUM SERPL-SCNC: 3.8 MEQ/L (ref 3.5–5.1)
RBC # BLD AUTO: 4.08 10^6/UL (ref 4.3–6.1)
SODIUM SERPL-SCNC: 138 MEQ/L (ref 136–145)
WBC # BLD AUTO: 11.6 10^3/UL (ref 4–10)

## 2020-06-11 RX ADMIN — CEFEPIME HYDROCHLORIDE SCH MLS/HR: 2 INJECTION, POWDER, FOR SOLUTION INTRAVENOUS at 19:40

## 2020-06-11 RX ADMIN — SODIUM CHLORIDE SCH MLS/HR: 9 INJECTION, SOLUTION INTRAVENOUS at 11:42

## 2020-06-11 RX ADMIN — CEFEPIME HYDROCHLORIDE SCH MLS/HR: 2 INJECTION, POWDER, FOR SOLUTION INTRAVENOUS at 03:03

## 2020-06-11 RX ADMIN — RAMELTEON SCH MG: 8 TABLET ORAL at 20:56

## 2020-06-11 RX ADMIN — KETOROLAC TROMETHAMINE SCH MG: 30 INJECTION, SOLUTION INTRAMUSCULAR at 04:22

## 2020-06-11 RX ADMIN — KETOROLAC TROMETHAMINE SCH MG: 30 INJECTION, SOLUTION INTRAMUSCULAR at 20:56

## 2020-06-11 RX ADMIN — KETOROLAC TROMETHAMINE SCH MG: 30 INJECTION, SOLUTION INTRAMUSCULAR at 12:31

## 2020-06-11 RX ADMIN — SODIUM CHLORIDE SCH UNITS: 4.5 INJECTION, SOLUTION INTRAVENOUS at 21:05

## 2020-06-11 RX ADMIN — SODIUM CHLORIDE SCH MLS/HR: 9 INJECTION, SOLUTION INTRAVENOUS at 18:49

## 2020-06-11 RX ADMIN — SODIUM CHLORIDE SCH UNITS: 4.5 INJECTION, SOLUTION INTRAVENOUS at 04:22

## 2020-06-11 RX ADMIN — SODIUM CHLORIDE SCH UNITS: 4.5 INJECTION, SOLUTION INTRAVENOUS at 12:26

## 2020-06-11 RX ADMIN — CEFEPIME HYDROCHLORIDE SCH MLS/HR: 2 INJECTION, POWDER, FOR SOLUTION INTRAVENOUS at 11:42

## 2020-06-11 NOTE — T-ECHO
DATE OF STUDY:  06/10/2020

 

REFERRING PHYSICIAN:  Dr. Urbina

 

INDICATION:  Infective endocarditis.

 

PREPROCEDURE DIAGNOSIS:  Infective endocarditis.

 

POSTPROCEDURE DIAGNOSIS:  Small pericardial effusion.  No vegetations.

 

FINDINGS:  Small pericardial effusion.  No vegetations.

 

PROCEDURE PERFORMED:  Transesophageal echocardiogram.

 

PROCEDURE PERFORMED BY:  Farrukh Barnett MD

 

ASSISTANT:  None.

 

IV SEDATION:  Monitored anesthetic care/propofol IV per CRNA.

 

COMPLICATIONS:  None.

 

PROCEDURE DESCRIPTION:  The rhythm was sinus.  The patient received viscous

lidocaine 4% to gargle and swallow.  He then received propofol IV per CRNA.

Esophageal intubation using a 3D Alfredo transesophageal echocardiogram probe was

performed by Dr. Barnett.  The left and right ventricles appeared normal in size

and systolic function and without regional wall motion abnormalities.  The left

ventricle ejection fraction was 60-65% by visual estimate.  Atria appeared

normal.  No mass or thrombi were seen within the atria or their appendages.

Atrial septum appeared intact anatomically and by color flow Doppler.  No

vegetations were seen on any of the cardiac valves.  Aortic valve was 3-cuspid

and was structurally and functionally normal.  Mitral leaflets were structurally

and functionally normal with very mild mitral regurgitation within normal limits.

The tricuspid and pulmonic valves were normal and without reurgitation.  A small

pericardial effusion was seen near the left ventricle apex.  No diastolic chamber

collapse.  The distal aortic arch and descending thoracic aorta were normal.

 

CONCLUSIONS:

1.  Small pericardial effusion, no diastolic chamber collapse.

2.  Structurally and functionally normal cardiac valves.

3.  No vegetations.

4.  Normal left and right ventricle size and systolic function.

5.  Otherwise normal transesophageal echocardiogram Doppler findings.

6.  Normal pulse wave Doppler flow pattern in the left upper pulmonary vein.

## 2020-06-11 NOTE — IPN
DATE:  06/11/2020

 

Emigdio is doing much better.  He is alert, awake and discussing his previous history

with me.  He states he got very scared this time, was having severe back pain and

was worried about being paralyzed.  His girlfriend, Kerri Rios, also has

hepatitis C and had an abscess that needed to be drained in her right forearm.

He has had no fever or chills for the past three days.

 

Temperature is 98.1, pulse 72, respirations 18, blood pressure 141/75, oxygen

saturation (O2 sat) 100% on room air.  Heart:  Normal, S1, S2.  No murmurs, rubs

or gallops.  Lungs are clear.  No wheezes, rales or rhonchi.  Abdomen:  Soft,

nontender.  No hepatosplenomegaly.  Back:  Mid-thoracic tenderness, mild.

Extremities:  No clubbing, cyanosis or edema.

 

LABORATORY DATA:  White count 11.6, hemoglobin 11.2, hematocrit 34.7, platelets

324, 71% neutrophils, 18% lymphocytes, 7% monocytes.  Sodium 138, potassium 3.8,

chloride 106, bicarbonate 26, BUN 16, creatinine 0.62, glucose 137, calcium 8.3,

magnesium 1.7, CRP 5.27 down from 17.2.  Blood cultures on 06/07/2020:  One set

had methicillin-sensitive Staphylococcus aureus (MSSA) and Enterococcus faecalis,

another set a minute later had MSSA.  Urine culture was negative.  Blood cultures

on 06/09/2020 and 06/10/2020 were no growth after 24 hours.

 

Chest CT showed no acute findings.  No septic emboli.  No pneumonia.  There is a

T6-T10 7.4 cm discitis superior to inferior and some mild splenomegaly.

 

Transesophageal echocardiogram showed no endocarditis..  No vegetations on mitral

valve.

 

IMPRESSION:

1.  Acute vertebral spondylodiscitis with culture positive for Enterobacter

cloacae, on IV cefepime 2 grams every 8 hours.  The patient was diagnosed with

discitis on 05/02/2020 but did not take his IV antibiotics as prescribed.  I

recommended that he takes at least 2 weeks of IV antibiotics in the hospital this

time before with we switch him to oral.

2.  MSSA bacteremia, on IV cefepime with repeat negative cultures.  Negative

transesophageal echocardiogram (ARIEL).  No evidence of metastatic infection at

this point.

3.  Enterococcus (E) faecalis on one blood culture.  I suspect this was a

contaminant.  Repeat cultures have been negative.

4.  Drug abuse, opiates and cocaine.  The patient is willing to go to [facility] and

comply with treatment recommendations.

5.  Chronic hepatitis C.  Treated on two different occasions in the retirement system

with Epclusa, and possibly Harvoni, or Ashley; he is not sure and he got cured

but relapsed and is reinfected.

 

PLAN: Continue IV cefepime.  I would disregard Enterococcus faecalis on blood

culture.  I suspect it is more of a contaminant.  Continue with IV cefepime for

at least 2 weeks from negative cultures, which would be June 23, 2020, following

which we can switch him to oral antibiotics if continues to improve.  Please

consult patient and family services (PFS) for placement to reestablish with [facility]

for addiction.  The patient does not have a home; he has been homeless.

## 2020-06-12 VITALS — SYSTOLIC BLOOD PRESSURE: 120 MMHG | DIASTOLIC BLOOD PRESSURE: 66 MMHG

## 2020-06-12 VITALS — DIASTOLIC BLOOD PRESSURE: 70 MMHG | SYSTOLIC BLOOD PRESSURE: 126 MMHG

## 2020-06-12 VITALS — SYSTOLIC BLOOD PRESSURE: 120 MMHG | DIASTOLIC BLOOD PRESSURE: 65 MMHG

## 2020-06-12 VITALS — SYSTOLIC BLOOD PRESSURE: 115 MMHG | DIASTOLIC BLOOD PRESSURE: 67 MMHG

## 2020-06-12 VITALS — SYSTOLIC BLOOD PRESSURE: 107 MMHG | DIASTOLIC BLOOD PRESSURE: 71 MMHG

## 2020-06-12 LAB
BASOPHILS # BLD AUTO: 0.1 10^3/UL (ref 0–0.2)
BASOPHILS NFR BLD AUTO: 1 % (ref 0–1)
BUN SERPL-MCNC: 11 MG/DL (ref 7–18)
CALCIUM SERPL-MCNC: 8.3 MG/DL (ref 8.5–10.1)
CHLORIDE SERPL-SCNC: 107 MEQ/L (ref 98–107)
CO2 SERPL-SCNC: 26 MEQ/L (ref 21–32)
CREAT SERPL-MCNC: 0.64 MG/DL (ref 0.7–1.3)
EOSINOPHIL # BLD AUTO: 0.4 10^3/UL (ref 0–0.5)
EOSINOPHIL NFR BLD AUTO: 3.8 % (ref 0–3)
GFR SERPL CREATININE-BSD FRML MDRD: > 60 ML/MIN/{1.73_M2} (ref 60–?)
GLUCOSE SERPL-MCNC: 126 MG/DL (ref 70–100)
HCT VFR BLD AUTO: 35.3 % (ref 42–52)
HCV AB SER QL: > 11 INDEX (ref ?–0.8)
HGB BLD-MCNC: 11.4 G/DL (ref 13.5–17.5)
LYMPHOCYTES # BLD AUTO: 3 10^3/UL (ref 1.5–5)
LYMPHOCYTES NFR BLD AUTO: 31.8 % (ref 24–44)
MAGNESIUM SERPL-MCNC: 1.6 MG/DL (ref 1.8–2.4)
MCH RBC QN AUTO: 27.7 PG (ref 27–33)
MCHC RBC AUTO-ENTMCNC: 32.3 G/DL (ref 32–36.5)
MCV RBC AUTO: 85.7 FL (ref 80–96)
MONOCYTES # BLD AUTO: 0.7 10^3/UL (ref 0–0.8)
MONOCYTES NFR BLD AUTO: 7.5 % (ref 0–5)
NEUTROPHILS # BLD AUTO: 5.1 10^3/UL (ref 1.5–8.5)
NEUTROPHILS NFR BLD AUTO: 54.4 % (ref 36–66)
PLATELET # BLD AUTO: 308 10^3/UL (ref 150–450)
POTASSIUM SERPL-SCNC: 3.7 MEQ/L (ref 3.5–5.1)
RBC # BLD AUTO: 4.12 10^6/UL (ref 4.3–6.1)
SODIUM SERPL-SCNC: 142 MEQ/L (ref 136–145)
WBC # BLD AUTO: 9.3 10^3/UL (ref 4–10)

## 2020-06-12 RX ADMIN — CEFEPIME HYDROCHLORIDE SCH MLS/HR: 2 INJECTION, POWDER, FOR SOLUTION INTRAVENOUS at 20:01

## 2020-06-12 RX ADMIN — SODIUM CHLORIDE SCH UNITS: 4.5 INJECTION, SOLUTION INTRAVENOUS at 14:00

## 2020-06-12 RX ADMIN — SODIUM CHLORIDE SCH MLS/HR: 9 INJECTION, SOLUTION INTRAVENOUS at 05:38

## 2020-06-12 RX ADMIN — SODIUM CHLORIDE SCH UNITS: 4.5 INJECTION, SOLUTION INTRAVENOUS at 05:07

## 2020-06-12 RX ADMIN — SODIUM CHLORIDE SCH UNITS: 4.5 INJECTION, SOLUTION INTRAVENOUS at 20:01

## 2020-06-12 RX ADMIN — SODIUM CHLORIDE SCH MLS/HR: 9 INJECTION, SOLUTION INTRAVENOUS at 16:32

## 2020-06-12 RX ADMIN — KETOROLAC TROMETHAMINE SCH MG: 30 INJECTION, SOLUTION INTRAMUSCULAR at 20:00

## 2020-06-12 RX ADMIN — KETOROLAC TROMETHAMINE SCH MG: 30 INJECTION, SOLUTION INTRAMUSCULAR at 12:10

## 2020-06-12 RX ADMIN — KETOROLAC TROMETHAMINE SCH MG: 30 INJECTION, SOLUTION INTRAMUSCULAR at 04:25

## 2020-06-12 RX ADMIN — CEFEPIME HYDROCHLORIDE SCH MLS/HR: 2 INJECTION, POWDER, FOR SOLUTION INTRAVENOUS at 12:11

## 2020-06-12 RX ADMIN — RAMELTEON SCH MG: 8 TABLET ORAL at 20:00

## 2020-06-12 RX ADMIN — CEFEPIME HYDROCHLORIDE SCH MLS/HR: 2 INJECTION, POWDER, FOR SOLUTION INTRAVENOUS at 04:25

## 2020-06-12 NOTE — IPN
DATE:  06/12/2020

 

Emigdio is in good spirits today.  He has been very compliant and has been following

most recommendations.  He refuses to be on heparin 5000 units subcutaneous every

8 hours.  He states his back pain is markedly better.  His last dose of morphine

was at 5 a.m. on Rosangela 10.  He states that he sees a marked difference when he

starts using his intravenous (IV) antibiotics.  His back pain dramatically

improves.  He has no cough.  No shortness of breath.  No fever or pain.  Mild

mid-thoracic back pain.

 

LABORATORY DATA:

White count 9.3, hemoglobin 11.4, hematocrit 35.3, platelets 308, 54%

neutrophils, 32% lymphocytes, 7% monocytes.

 

Sodium 142, potassium 3.7, chloride 107, bicarbonate 26, BUN 11, creatinine 
0.64,

glucose 126, calcium 8.3, magnesium 1.6.

 

Hepatitis C antibody more than 11.  HCV RNA pending.  MRSA screen detected.

 

Blood cultures, two sets, had MSSA ,and one out of two sets had Enterococcus

faecalis.  I suspect the E. Faecalis was a contaminant.  Repeat blood cultures 
on

June 9, one set, is negative, and Rosangela 10 one set is negative.

 

Chest CT showed no evidence of infection.  Disc space discitis from T8 to T9 
with

paraspinal soft tissue swelling from T6 to T10.  Mild splenomegaly.

 

PHYSICAL EXAMINATION:

HEART:  Normal S1, S2.  No murmurs, rubs, or gallops.

LUNGS:  Clear.  No wheezes, rales, or rhonchi.

ABDOMEN:  Soft, nontender.  No hepatosplenomegaly.

BACK:  Mid thoracic tenderness, mild.

EXTREMITIES:  No clubbing, cyanosis, or edema.  Some tattoos.  No abscesses.

Temperature is 98.10, pulse 72, respirations 18, blood pressure 115/67, oxygen

saturation 97% on room air.

 

IMPRESSION:

1.  Acute vertebral spondylodiscitis.  Culture positive for Enterobacter 
cloacae,

on IV cefepime 2 grams every 8 hours.  The patient will be treated with at least

2 weeks of IV antibiotics from this admission, followed by Levaquin.

 

2.  Methicillin-sensitive Staphylococcus aureus (MSSA) bacteremia, on IV 
cefepime

with repeat negative cultures.  Most likely transient bacteremia from IV drug

use. Negative CT chest.  Negative transesophageal echocardiogram (ARIEL).

 

3.  Enterococcus (E) faealis, on one of the blood cultures.  Suspect this is a

contaminant .  Would not treat.

 

4.  History of the drug abuse, opiates and cocaine.  The patient is willing to

followup at [facility].

 

5.  Chronic hepatitis C, treated on two different occasions in the jail system

with Epclusa.  Repeat hepatitis C antibody was done to see if he still has RNA.

 

PLAN: Continue cefepime 2 grams IV every 8 hours.  Treat minimum until June 23

with IV followed by a oral levofloxacin.  Consult patient and family services

(PFS) for homelessness.  Provide connection with  as an outpatient 
to

help him with housing and compliance with doctor visits.  Refer to [facility] upon

discharge.

 

Will consult microbiology to check his Staphylococcus aureus was susceptible to

levofloxacin.

LOIDA

## 2020-06-13 VITALS — DIASTOLIC BLOOD PRESSURE: 65 MMHG | SYSTOLIC BLOOD PRESSURE: 120 MMHG

## 2020-06-13 VITALS — SYSTOLIC BLOOD PRESSURE: 122 MMHG | DIASTOLIC BLOOD PRESSURE: 65 MMHG

## 2020-06-13 LAB
BASOPHILS # BLD AUTO: 0.1 10^3/UL (ref 0–0.2)
BASOPHILS NFR BLD AUTO: 1 % (ref 0–1)
BUN SERPL-MCNC: 12 MG/DL (ref 7–18)
CALCIUM SERPL-MCNC: 8.1 MG/DL (ref 8.5–10.1)
CHLORIDE SERPL-SCNC: 106 MEQ/L (ref 98–107)
CO2 SERPL-SCNC: 30 MEQ/L (ref 21–32)
CREAT SERPL-MCNC: 0.57 MG/DL (ref 0.7–1.3)
EOSINOPHIL # BLD AUTO: 0.3 10^3/UL (ref 0–0.5)
EOSINOPHIL NFR BLD AUTO: 3.5 % (ref 0–3)
GFR SERPL CREATININE-BSD FRML MDRD: > 60 ML/MIN/{1.73_M2} (ref 60–?)
GLUCOSE SERPL-MCNC: 92 MG/DL (ref 70–100)
HCT VFR BLD AUTO: 34 % (ref 42–52)
HGB BLD-MCNC: 10.8 G/DL (ref 13.5–17.5)
LYMPHOCYTES # BLD AUTO: 2.4 10^3/UL (ref 1.5–5)
LYMPHOCYTES NFR BLD AUTO: 29.5 % (ref 24–44)
MAGNESIUM SERPL-MCNC: 1.6 MG/DL (ref 1.8–2.4)
MCH RBC QN AUTO: 27.4 PG (ref 27–33)
MCHC RBC AUTO-ENTMCNC: 31.8 G/DL (ref 32–36.5)
MCV RBC AUTO: 86.3 FL (ref 80–96)
MONOCYTES # BLD AUTO: 0.6 10^3/UL (ref 0–0.8)
MONOCYTES NFR BLD AUTO: 7.3 % (ref 0–5)
NEUTROPHILS # BLD AUTO: 4.6 10^3/UL (ref 1.5–8.5)
NEUTROPHILS NFR BLD AUTO: 57.1 % (ref 36–66)
PLATELET # BLD AUTO: 271 10^3/UL (ref 150–450)
POTASSIUM SERPL-SCNC: 4.6 MEQ/L (ref 3.5–5.1)
RBC # BLD AUTO: 3.94 10^6/UL (ref 4.3–6.1)
SODIUM SERPL-SCNC: 141 MEQ/L (ref 136–145)
WBC # BLD AUTO: 8.1 10^3/UL (ref 4–10)

## 2020-06-13 RX ADMIN — KETOROLAC TROMETHAMINE SCH MG: 30 INJECTION, SOLUTION INTRAMUSCULAR at 12:08

## 2020-06-13 RX ADMIN — KETOROLAC TROMETHAMINE SCH MG: 30 INJECTION, SOLUTION INTRAMUSCULAR at 04:48

## 2020-06-13 RX ADMIN — SODIUM CHLORIDE SCH UNITS: 4.5 INJECTION, SOLUTION INTRAVENOUS at 20:24

## 2020-06-13 RX ADMIN — SODIUM CHLORIDE SCH MLS/HR: 9 INJECTION, SOLUTION INTRAVENOUS at 12:09

## 2020-06-13 RX ADMIN — SODIUM CHLORIDE SCH MLS/HR: 9 INJECTION, SOLUTION INTRAVENOUS at 01:52

## 2020-06-13 RX ADMIN — SODIUM CHLORIDE SCH MLS/HR: 9 INJECTION, SOLUTION INTRAVENOUS at 20:24

## 2020-06-13 RX ADMIN — SODIUM CHLORIDE SCH UNITS: 4.5 INJECTION, SOLUTION INTRAVENOUS at 04:48

## 2020-06-13 RX ADMIN — SODIUM CHLORIDE SCH UNITS: 4.5 INJECTION, SOLUTION INTRAVENOUS at 14:00

## 2020-06-13 RX ADMIN — CEFEPIME HYDROCHLORIDE SCH MLS/HR: 2 INJECTION, POWDER, FOR SOLUTION INTRAVENOUS at 04:48

## 2020-06-13 RX ADMIN — CEFEPIME HYDROCHLORIDE SCH MLS/HR: 2 INJECTION, POWDER, FOR SOLUTION INTRAVENOUS at 12:08

## 2020-06-13 RX ADMIN — CEFEPIME HYDROCHLORIDE SCH MLS/HR: 2 INJECTION, POWDER, FOR SOLUTION INTRAVENOUS at 20:24

## 2020-06-14 VITALS — DIASTOLIC BLOOD PRESSURE: 60 MMHG | SYSTOLIC BLOOD PRESSURE: 129 MMHG

## 2020-06-14 VITALS — SYSTOLIC BLOOD PRESSURE: 140 MMHG | DIASTOLIC BLOOD PRESSURE: 75 MMHG

## 2020-06-14 VITALS — DIASTOLIC BLOOD PRESSURE: 55 MMHG | SYSTOLIC BLOOD PRESSURE: 115 MMHG

## 2020-06-14 LAB
BASOPHILS # BLD AUTO: 0.1 10^3/UL (ref 0–0.2)
BASOPHILS NFR BLD AUTO: 0.6 % (ref 0–1)
BUN SERPL-MCNC: 13 MG/DL (ref 7–18)
CALCIUM SERPL-MCNC: 8.6 MG/DL (ref 8.5–10.1)
CHLORIDE SERPL-SCNC: 105 MEQ/L (ref 98–107)
CO2 SERPL-SCNC: 27 MEQ/L (ref 21–32)
CREAT SERPL-MCNC: 0.63 MG/DL (ref 0.7–1.3)
EOSINOPHIL # BLD AUTO: 0.3 10^3/UL (ref 0–0.5)
EOSINOPHIL NFR BLD AUTO: 2.9 % (ref 0–3)
GFR SERPL CREATININE-BSD FRML MDRD: > 60 ML/MIN/{1.73_M2} (ref 60–?)
GLUCOSE SERPL-MCNC: 88 MG/DL (ref 70–100)
HCT VFR BLD AUTO: 33.6 % (ref 42–52)
HGB BLD-MCNC: 11.1 G/DL (ref 13.5–17.5)
LYMPHOCYTES # BLD AUTO: 3 10^3/UL (ref 1.5–5)
LYMPHOCYTES NFR BLD AUTO: 26 % (ref 24–44)
MAGNESIUM SERPL-MCNC: 1.7 MG/DL (ref 1.8–2.4)
MCH RBC QN AUTO: 28.2 PG (ref 27–33)
MCHC RBC AUTO-ENTMCNC: 33 G/DL (ref 32–36.5)
MCV RBC AUTO: 85.5 FL (ref 80–96)
MONOCYTES # BLD AUTO: 0.7 10^3/UL (ref 0–0.8)
MONOCYTES NFR BLD AUTO: 6.1 % (ref 0–5)
NEUTROPHILS # BLD AUTO: 7.2 10^3/UL (ref 1.5–8.5)
NEUTROPHILS NFR BLD AUTO: 62.8 % (ref 36–66)
PLATELET # BLD AUTO: 299 10^3/UL (ref 150–450)
POTASSIUM SERPL-SCNC: 3.9 MEQ/L (ref 3.5–5.1)
RBC # BLD AUTO: 3.93 10^6/UL (ref 4.3–6.1)
SODIUM SERPL-SCNC: 138 MEQ/L (ref 136–145)
WBC # BLD AUTO: 11.6 10^3/UL (ref 4–10)

## 2020-06-14 RX ADMIN — MORPHINE SULFATE PRN MG: 4 INJECTION INTRAVENOUS at 16:58

## 2020-06-14 RX ADMIN — SODIUM CHLORIDE SCH MLS/HR: 9 INJECTION, SOLUTION INTRAVENOUS at 20:00

## 2020-06-14 RX ADMIN — SODIUM CHLORIDE SCH MLS/HR: 9 INJECTION, SOLUTION INTRAVENOUS at 16:49

## 2020-06-14 RX ADMIN — SODIUM CHLORIDE SCH UNITS: 4.5 INJECTION, SOLUTION INTRAVENOUS at 04:58

## 2020-06-14 RX ADMIN — SODIUM CHLORIDE SCH UNITS: 4.5 INJECTION, SOLUTION INTRAVENOUS at 22:00

## 2020-06-14 RX ADMIN — CEFEPIME HYDROCHLORIDE SCH MLS/HR: 2 INJECTION, POWDER, FOR SOLUTION INTRAVENOUS at 11:38

## 2020-06-14 RX ADMIN — MORPHINE SULFATE PRN MG: 4 INJECTION INTRAVENOUS at 04:54

## 2020-06-14 RX ADMIN — SODIUM CHLORIDE SCH UNITS: 4.5 INJECTION, SOLUTION INTRAVENOUS at 14:00

## 2020-06-14 RX ADMIN — MORPHINE SULFATE PRN MG: 4 INJECTION INTRAVENOUS at 11:39

## 2020-06-14 RX ADMIN — CEFEPIME HYDROCHLORIDE SCH MLS/HR: 2 INJECTION, POWDER, FOR SOLUTION INTRAVENOUS at 04:23

## 2020-06-14 RX ADMIN — CEFEPIME HYDROCHLORIDE SCH MLS/HR: 2 INJECTION, POWDER, FOR SOLUTION INTRAVENOUS at 20:39

## 2020-06-15 VITALS — SYSTOLIC BLOOD PRESSURE: 108 MMHG | DIASTOLIC BLOOD PRESSURE: 81 MMHG

## 2020-06-15 VITALS — DIASTOLIC BLOOD PRESSURE: 61 MMHG | SYSTOLIC BLOOD PRESSURE: 132 MMHG

## 2020-06-15 LAB
BUN SERPL-MCNC: 13 MG/DL (ref 7–18)
CALCIUM SERPL-MCNC: 8.6 MG/DL (ref 8.5–10.1)
CHLORIDE SERPL-SCNC: 103 MEQ/L (ref 98–107)
CO2 SERPL-SCNC: 31 MEQ/L (ref 21–32)
CREAT SERPL-MCNC: 0.64 MG/DL (ref 0.7–1.3)
GFR SERPL CREATININE-BSD FRML MDRD: > 60 ML/MIN/{1.73_M2} (ref 60–?)
GLUCOSE SERPL-MCNC: 92 MG/DL (ref 70–100)
HCT VFR BLD AUTO: 34.6 % (ref 42–52)
HGB BLD-MCNC: 10.9 G/DL (ref 13.5–17.5)
MCH RBC QN AUTO: 27.7 PG (ref 27–33)
MCHC RBC AUTO-ENTMCNC: 31.5 G/DL (ref 32–36.5)
MCV RBC AUTO: 87.8 FL (ref 80–96)
PLATELET # BLD AUTO: 297 10^3/UL (ref 150–450)
POTASSIUM SERPL-SCNC: 4.3 MEQ/L (ref 3.5–5.1)
RBC # BLD AUTO: 3.94 10^6/UL (ref 4.3–6.1)
SODIUM SERPL-SCNC: 139 MEQ/L (ref 136–145)
WBC # BLD AUTO: 10.7 10^3/UL (ref 4–10)

## 2020-06-15 RX ADMIN — MORPHINE SULFATE PRN MG: 4 INJECTION INTRAVENOUS at 16:48

## 2020-06-15 RX ADMIN — SODIUM CHLORIDE SCH MLS/HR: 9 INJECTION, SOLUTION INTRAVENOUS at 15:51

## 2020-06-15 RX ADMIN — SODIUM CHLORIDE SCH UNITS: 4.5 INJECTION, SOLUTION INTRAVENOUS at 06:00

## 2020-06-15 RX ADMIN — SODIUM CHLORIDE SCH UNITS: 4.5 INJECTION, SOLUTION INTRAVENOUS at 21:10

## 2020-06-15 RX ADMIN — SODIUM CHLORIDE SCH MLS/HR: 9 INJECTION, SOLUTION INTRAVENOUS at 03:52

## 2020-06-15 RX ADMIN — IBUPROFEN SCH MG: 400 TABLET, FILM COATED ORAL at 16:49

## 2020-06-15 RX ADMIN — CEFEPIME HYDROCHLORIDE SCH MLS/HR: 2 INJECTION, POWDER, FOR SOLUTION INTRAVENOUS at 20:45

## 2020-06-15 RX ADMIN — MORPHINE SULFATE PRN MG: 4 INJECTION INTRAVENOUS at 09:28

## 2020-06-15 RX ADMIN — CEFEPIME HYDROCHLORIDE SCH MLS/HR: 2 INJECTION, POWDER, FOR SOLUTION INTRAVENOUS at 03:52

## 2020-06-15 RX ADMIN — SODIUM CHLORIDE SCH UNITS: 4.5 INJECTION, SOLUTION INTRAVENOUS at 16:05

## 2020-06-15 RX ADMIN — CEFEPIME HYDROCHLORIDE SCH MLS/HR: 2 INJECTION, POWDER, FOR SOLUTION INTRAVENOUS at 12:12

## 2020-06-15 RX ADMIN — IBUPROFEN SCH MG: 400 TABLET, FILM COATED ORAL at 21:10

## 2020-06-15 RX ADMIN — MORPHINE SULFATE PRN MG: 4 INJECTION INTRAVENOUS at 17:06

## 2020-06-15 RX ADMIN — MORPHINE SULFATE PRN MG: 4 INJECTION INTRAVENOUS at 01:02

## 2020-06-15 NOTE — REPVR
PROCEDURE INFORMATION: 

Exam: MR Thoracic Spine Without and With Contrast 

Exam date and time: 6/15/2020 6:26 PM 

Age: 30 years old 

Clinical indication: Pain in thoracic spine; Without myelpathy or 

radiculopathy; Patient HX: F/u discitis; Additional info: Worsening back pain 

with known discitis 



TECHNIQUE: 

Imaging protocol: Multiplanar magnetic resonance images of the thoracic spine 

without and with intravenous contrast. 

Contrast material: PROHANCE; Contrast volume: 17 ml; Contrast route: IV;  



COMPARISON: 

MRI-T SPINE W/O FOLL WITH CON 6/7/2020 12:08 PM 



FINDINGS: 

Limitations: Patient motion. 

Vertebrae: See "Discs/Spinal canal/Neural foramina" finding. 

Spinal cord: No cord compression. 

Discs/Spinal canal/Neural foramina: There again appears to be fluid in the T8-9 

disc space and mild loss of vertebral body height at the T8 and T9 levels with 

loss of the endplate cortical margins consistent with discitis/osteomyelitis. 

There is a small fluid collection along the anterior margin of the disc 

measuring 1 cm which previously measured 1.8 cm. Series 401, image 1 frame 9. 

Increased signal is seen throughout the T8 and T9 vertebra on the T2 weighted 

and STIR sequences. There is also increased intensity in the paraspinous soft 

tissues at this level although the axial images are very limited. There is also 

enhancement within the vertebral bodies and intervening disc at the T8-9 level 

and surrounding paraspinous soft tissues without obvious epidural abscess. The 

anterior longitudinal ligament appears to be discontinuous at the T8 level. 

Series 1001, image 1 frame 5. 



IMPRESSION: 

There is persistent discitis/osteomyelitis at the T8-9 level with perhaps 

decrease in size of the small prevertebral fluid collection. There also appears 

to be diminishment in the intensity and extension of the prevertebral 

enhancement which extends from approximately T5 through T10 levels in 

comparison to the 06/07/2020 MRI. No new levels of discitis/osteomyelitis are 

apparent. There is no definite evidence of an epidural abscess however the 

examination is limited.



Electronically signed by: Michelle Solano On 06/15/2020  19:29:46 PM

## 2020-06-15 NOTE — REPVR
PROCEDURE INFORMATION: 

Exam: MR Lumbar Spine Without and With Contrast. 

Exam date and time: 6/15/2020 6:26 PM 

Age: 30 years old 

Clinical indication: Pain; [place]; Additional info: Mssa bacteremia / t8-9 

discitis 



TECHNIQUE: 

Imaging protocol: Multiplanar magnetic resonance images of the lumbar spine 

without and with intravenous contrast. 

Contrast material: PROHANCE; Contrast volume: 17 ml; Contrast route: IV;  



COMPARISON: 

MRI-LS SPINE W/O FOLL WITH CON 5/2/2020 12:35 PM 



FINDINGS: 

Limitations: Patient motion. 



Vertebrae: There is mild increased signal in the superior endplate of L4 as 

well as within the L5-S1 vertebra particularly to the right of midline 

posteriorly which enhance with gadolinium.

Spinal cord: Not well visualized.

Discs/Spinal canal/Neural foramina:  

L3-L4: There is a centrally bulging annulus at L3-L4 without evidence of neural 

compromise.

L4-L5: There is a small central/ left paracentral disc herniation likely 

compressing the exiting left L5 nerve root.

L5-S1: There is a probable left paracentral disc herniation likely affecting 

the left S1 nerve root.

Soft tissues: Unremarkable. 



IMPRESSION: 

Limited examination due to patient motion. There is increased signal and 

enhancement within the vertebral body endplates posteriorly to the right of 

midline at the L5-S1 level and the disc which may be due to degeneration 

however discitis/osteomyelitis are also within the differential. No epidural 

abscess is identified. The appearance is unchanged in comparison to the 

05/02/2020 MRI.



Electronically signed by: Michelle Solano On 06/15/2020  19:15:21 PM

## 2020-06-16 VITALS — DIASTOLIC BLOOD PRESSURE: 88 MMHG | SYSTOLIC BLOOD PRESSURE: 149 MMHG

## 2020-06-16 LAB
BUN SERPL-MCNC: 16 MG/DL (ref 7–18)
CALCIUM SERPL-MCNC: 9.1 MG/DL (ref 8.5–10.1)
CHLORIDE SERPL-SCNC: 105 MEQ/L (ref 98–107)
CO2 SERPL-SCNC: 27 MEQ/L (ref 21–32)
CREAT SERPL-MCNC: 0.77 MG/DL (ref 0.7–1.3)
CRP SERPL-MCNC: 0.69 MG/DL (ref 0–0.3)
GFR SERPL CREATININE-BSD FRML MDRD: > 60 ML/MIN/{1.73_M2} (ref 60–?)
GLUCOSE SERPL-MCNC: 86 MG/DL (ref 70–100)
HCT VFR BLD AUTO: 39.7 % (ref 42–52)
HGB BLD-MCNC: 12.6 G/DL (ref 13.5–17.5)
MAGNESIUM SERPL-MCNC: 1.6 MG/DL (ref 1.8–2.4)
MCH RBC QN AUTO: 28.1 PG (ref 27–33)
MCHC RBC AUTO-ENTMCNC: 31.7 G/DL (ref 32–36.5)
MCV RBC AUTO: 88.4 FL (ref 80–96)
PLATELET # BLD AUTO: 302 10^3/UL (ref 150–450)
POTASSIUM SERPL-SCNC: 3.9 MEQ/L (ref 3.5–5.1)
RBC # BLD AUTO: 4.49 10^6/UL (ref 4.3–6.1)
SODIUM SERPL-SCNC: 136 MEQ/L (ref 136–145)
WBC # BLD AUTO: 11.1 10^3/UL (ref 4–10)

## 2020-06-16 RX ADMIN — IBUPROFEN SCH MG: 400 TABLET, FILM COATED ORAL at 05:21

## 2020-06-16 RX ADMIN — CEFEPIME HYDROCHLORIDE SCH MLS/HR: 2 INJECTION, POWDER, FOR SOLUTION INTRAVENOUS at 04:00

## 2020-06-16 RX ADMIN — SODIUM CHLORIDE SCH MLS/HR: 9 INJECTION, SOLUTION INTRAVENOUS at 09:13

## 2020-06-16 RX ADMIN — SODIUM CHLORIDE SCH MLS/HR: 9 INJECTION, SOLUTION INTRAVENOUS at 09:09

## 2020-06-16 RX ADMIN — SODIUM CHLORIDE SCH UNITS: 4.5 INJECTION, SOLUTION INTRAVENOUS at 05:21

## 2020-06-16 NOTE — IPN
DATE:   06/15/2020

 

CHIEF COMPLAINT:   Low back pain

 

Today it is radiating into his right leg, with any position change the pain

shoots into his ankle.  He states this is different than the mid thoracic back

pain that he had with the Enterobacter thoracic diskitis.  He has been afebrile,

but his white count is slightly elevated 10.7, hemoglobin 10.9, hematocrit 34.6,

platelets 297.  Sodium 139, potassium 4.3, chloride 103, bicarb 31, BUN 13,

creatinine 0.64, glucose 92, calcium 8.6, C-reactive protein (CRP) 0.78.  Blood

cultures on 06/09 and 06/10 are negative.

 

On physical exam, temperature is 98.6, pulse 69, respirations 18, blood pressure

132/61, oxygen saturation 99% on room air.  Heart:  Normal S1-S2.  No murmurs.

Lungs are clear.  No wheezes or rhonchi.  Abdomen:  Soft, nontender.  Back:  Mild

lumbosacral tenderness.  Positive straight leg raising on the right side at 45

degree angle.  No thoracic tenderness.

 

IMPRESSION:

1.  Enterobacter cloacae diskitis T8-T9 on IV cefepime currently day number

nine.

2.  Staphylococcus aureus bacteremia with negative chest CT, negative

transesophageal echocardiogram.  With increasing low back pain, there is a

concern for seeding of the lumbar spine and causing another focus of diskitis.

Will obtain lumbar spine Magnetic Resonance Imaging (MRI) and one blood culture I

suspect is a contaminant.

3.  Chronic hepatitis C treated on two different occasions at the group home system.

The patient has a repeat RNA pending.

 

PLAN: Continue IV cefepime 2 grams every 8 hours.  Obtain followup thoracic and

lumbar MRI today as his pain is new and radiating to his right leg; this is more

of a lumbar complaint now and concern that he may have seeded from Staphylococcus

aureus bacteremia.  The only good news is that his CRP is down to 0.78, which

makes it less likely that this is infectious.  Add ibuprofen 400 mg every 8 hours

around the clock for back pain and possibly muscle relaxant.

## 2020-06-16 NOTE — DS.PDOC
Discharge Summary


General


Date of Admission


Jun 7, 2020 at 14:54


Date of Discharge


6/20/20





Discharge Summary


PROCEDURES PERFORMED DURING STAY: [None].





ADMITTING DIAGNOSES: 


MSSA


IV drug abuse


Acute on chronic osteomyelitis 


Hypomagnesemia


Hyponatremia


Acute kidney injury 


Substance abuse 


Hepatitis C


 Normocytic anemia


DISCHARGE DIAGNOSES:


IV drug abuse


MSSA


Acute on chronic osteomyelitis 


Hypomagnesemia


Hyponatremia


Acute kidney injury 


Substance abuse 


Hepatitis C


 Normocytic anemia





COMPLICATIONS/CHIEF COMPLAINT: Discitis Of Thoracic Region,Illicit Drug Use.





HISTORY OF PRESENT ILLNESS: 29 yo M IVDU with known enterobacter OM, now found 

to have MSSA bacteremia as well as 1 bottle growing E.fecalis that was deemed 

likely contaminant.











HOSPITAL COURSE: During hospital stay following issue addressed. Patient left 

the hospital AMA


1. MSSA likely 2/2 to IV drug use. 1 of the 6/7/BCx bottles growing E.fecalis 

that was deemed likely contaminant.


- ARIEL showed no vegetations


- CXR was wnl


- 6/7 BCx grew MSSA x 2, and one of the cultures grew E.fecalis


- UCx was negative


- Continue cefepime


- 6/9 BCx NGTD


- ID consulted, appreciate recs, will need at least 2 weeks of IV cefepime, and 

given worsening back pain and pending T-spine MRI, may be developing an epidural

 abscess and may require drainage + IV abx. Follow up T-spine MRI.





2. Acute on chronic osteomyelitis of T8/9  2/2 Enterobacter cloacae - likely 2/2

 IV drug abuse. 


- Previously left AMA on 5/22


- Patient has a recent diagnosis of osteomyelitis/discitis of T8-T9, diagnosed 

at [facility].  - Initial culture results were positive for Enterobacter

 cloacae; recommendations at that time were cefepime 2g q8h


- ID consulted, appreciate recs, will need at least 2 weeks of IV cefepime, and 

given worsening back pain and pending T-spine MRI, may be developing an epidural

 abscess and may require drainage + IV abx. Follow up T-spine MRI.


- with elevated ESR and CRP and 6/7 MRI showing Osteo/Diskitis at the T8/9 level

 with surrounding inflammatory changes and phlegmanous changes extending 

cranially towards T5/6 and caudally to T10/11.  No definite abscess formation at

 that time.  


- Orthopedic surgery was consulted and Dr. Horta recommended no surgical 

intervention at that time.





4. Hypomagnesemia: repleted


- monitor





5. Hyponatremia 2/2 hypotonic hypovolemia, resolved





6. Acute kidney injury 2/2 to dehydration.  Resolved with hydration


- Avoid nephrotoxic medications. 


- Daily BMP





7. Substance abuse with hx of IV Methamphetamines and Opiates. 


- Currently no signs/symptoms of withdrawal 


- HIV / Syphilis screen negative on 5/22 


- Ativan PRN





8. Hepatitis C


- Liver function is within normal limits


- per ID, has been treated 3 times but possibility is high risk for reinfection 

given persistent IVDU, f/u pending viral load





9. Normocytic anemia


- H/H has been stable since early 2020 


- No signs of acute bleeding 


- studies c/w AOCI with known osteomyelitis and elevated inflammatory markers


- Daily CBC





10. DVT prophylaxis 


- Heparin SQ





11. Insomnia:


-ambien 5 QHS





DISCHARGE MEDICATIONS: Please see below.


 


ALLERGIES: Please see below.





PHYSICAL EXAMINATION ON DISCHARGE:


VITAL SIGNS: Please see below.


GENERAL: 


HEENT: 


NECK: 


CARDIOVASCULAR EXAMINATION: 


RESPIRATORY EXAMINATION: 


ABDOMINAL EXAMINATION: 


EXTREMITIES: 


SKIN: 


NEUROLOGICAL EXAMINATION:  


PSYCHIATRIC EXAMINATION: 





LABORATORY DATA: Please see below.





IMAGING: 





PROGNOSIS: 





ACTIVITY: [As tolerated].





DIET: 





DISCHARGE PLAN: 





DISPOSITION: 07 Against Medical Advice.





DISCHARGE INSTRUCTIONS:


1. .





ITEMS TO FOLLOWUP ON ON OUTPATIENT:


1. .





DISCHARGE CONDITION: [Stable].





TIME SPENT ON DISCHARGE: Greater than  minutes.





Vital Signs/I&Os





Vital Signs








  Date Time  Temp Pulse Resp B/P (MAP) Pulse Ox O2 Delivery O2 Flow Rate FiO2


 


6/16/20 06:00 99.3 82 15 149/88 (108) 100 Room Air  














I&O- Last 24 Hours up to 6 AM 


 


 6/16/20





 06:00


 


Intake Total 2760 ml


 


Output Total 2350 ml


 


Balance 410 ml











Laboratory Data


Labs 24H


Laboratory Tests 2


6/16/20 06:17: 


Nucleated Red Blood Cells % (auto) 0.0, Anion Gap 4L, Glomerular Filtration Rate

 > 60.0, Calcium Level 9.1, Magnesium Level 1.6L, C-Reactive Protein, 

Quantitative 0.69H


CBC/BMP


Laboratory Tests


6/16/20 06:17











Microbiology





Microbiology


6/10/20 Blood Culture - Final, Complete


          NO GROWTH AFTER 5 DAYS


6/9/20 Blood Culture - Final, Complete


         NO GROWTH AFTER 5 DAYS


6/7/20 Urine Culture - Final, Complete


         


6/7/20 Blood Culture - Final, Complete


         Staphylococcus Aureus


6/7/20 Blood Culture - Final, Complete


         Staphylococcus Aureus


         Enterococcus Faecalis





Discharge Medications


No Active Prescriptions or Reported Meds





Allergies


Coded Allergies:  


     No Known Allergies (Unverified , 7/6/19)











JENNY PAGE DO            Jun 16, 2020 17:53

## 2021-04-21 ENCOUNTER — HOSPITAL ENCOUNTER (OUTPATIENT)
Dept: HOSPITAL 53 - M LAB REF | Age: 31
End: 2021-04-21
Attending: SURGERY
Payer: COMMERCIAL

## 2021-04-21 DIAGNOSIS — U07.1: Primary | ICD-10-CM

## 2024-05-17 ENCOUNTER — HOSPITAL ENCOUNTER (OUTPATIENT)
Dept: HOSPITAL 53 - M OUTALCOH | Age: 34
End: 2024-05-17
Attending: PSYCHIATRY & NEUROLOGY
Payer: SELF-PAY

## 2024-05-17 DIAGNOSIS — F11.10: Primary | ICD-10-CM

## 2024-05-17 DIAGNOSIS — F16.10: ICD-10-CM

## 2025-05-05 NOTE — IPNPDOC
Date Seen


The patient was seen on 6/8/20.





Progress Note


SUBJECTIVE: 


BCx x 2 sets growing Gram positive cocci in pairs, chains and clusters. Echo neg

for vegetations, afebrile, WBC 9.7. Denies chest pain, n/v/d, fevers, chills, 

shortness of breath. 





OBJECTIVE: 





VITAL SIGNS: 


Please see below





PHYSICAL EXAMINATION: 


CONSTITUTIONAL: No acute distress, resting comfortably, AAO x 3 but does not 

wish to communicate much 


EYES: PERRLA, EOM intact


HENT, MOUTH: Normocephalic, atraumatic, moist mucous membranes


NECK: SUPPLE, no JVD, no lymphadenopathy, no carotid bruit


CV: Regular rate and rhythm, S1S2 normal, no murmurs/rubs/gallops


RESPIRATORY: Clear to auscultation bilaterally, no rales/rhonchi/wheezes


GI:  BS positive in 4 quadrants, soft, nontender, nondistended, no rebound or 

guarding, no organomegaly


: Deferred


MUSCULOSKELETAL: Normal ROM. No cyanosis, clubbing, swelling, joint deformity, 

extremity edema


INTEGUMENTARY:  Multiple scabbed areas on upper and lower ext. Intact, no 

rashes, no erythema


NEUROLOGIC: No focal deficits





CURRENT MEDICATIONS: Please see below 





LABORATORY DATA: Please see below 





MICROBIOLOGY: 


6/7/20 Urine Culture, Received and Pending


6/7/20 Blood Culture- Gram positive cocci in pairs, chains and clusters


6/7/20 Blood Culture- Gram positive cocci in pairs, chains and clusters 


 


IMAGING: 


Echo pending 





ASSESSMENT: 29 y/o 





PLAN: 


1. Gram positive bacteremia likely 2/2 to IV drug use. 


- Echo showed no vegetations, afebrile, WBC wnl. 


- CXR neg 


- F/u UCx, sensitivities of BCx


- C/w Vancomycin 1 gm Q8H


- Daily labs 


- ID consulted to follow 





2. Acute on chronic osteomyelitis of T8/9  2/2 Enterobacter cloacae - likely 2/2

IV drug abuse. 


- Previously left AMA on 5/22


- Patient has a recent diagnosis of osteomyelitis/discitis of T8-T9, diagnosed 

at [facility] via Biopsy.  Initial culture results were positive for 

Enterobacter cloacae; recommendations at that time were cefepime 2g q8h


- Currently patient does not have any focal neurologic deficits; no urinary 

incontinence or bowel incontinence


- WBC 9.7, elevated ESR and CRP


- MRI 6/7: Severely limited by motion artifact.  Essentially unchanged from 

5/2/2020.  Osteo/Diskitis at the T8/9 level with surrounding inflammatory 

changes and phlegmanous changes extending cranially towards T5/6 and caudally to

T10/11.  No definite abscess formation.  


- Orthopedic surgery, Dr. Horta; recommendations were no surgical intervention 

required at this time


- C/w Cefepime 2 g Q8H


- ID consulted to follow 





4. Hypomagnesemia. 


- Replaced with 800 mg PO mag ox. F/u AM labs. 





5. Hyponatremia possibly 2/2 hypotonic hypovolemic etiology. 


- F/u daily labs. 


- C/w IVFs. 





6. Acute kidney injury likely 2/2 to dehydration. 


- Resolved, Cr now wnl.  


- Avoid nephrotoxic medications. 


- C/w IVFs as patient is not eating, drinking much. 


- Daily labs 





7. Substance abuse with hx of IV Methamphetamines and Opiates. 


- Currently no signs/symptoms of withdrawl 


- HIV / Syphilis screen negative on 5/22 


- Monitoring closely on telemetry. 


- Ativan PRN





8. Hepatitis C


- Liver function is within normal limits


- Unknown if patient has had treatment. 





9. Normocytic anemia 


- H/H has been stable since early 2020 


- No signs of acute bleeding 


- F/u iron, ferritin, TIBC


- Daily CBC





10. DVT prophylaxis 


- Heparin SQ





DISPOSITION: Currently under inpatient status. Patient is homeless so this 

complicates discharge with patient needing IV abx for 6-8 weeks for 

osteomyelitis. Discussions will need to take place about either staying in 

hospital vs. placement (which would be difficult due to his IV drug use, hx of 

leaving AMA). Will discuss with team.





VS, I&O, 24H, Fishbone


Vital Signs/I&O





Vital Signs








  Date Time  Temp Pulse Resp B/P (MAP) Pulse Ox O2 Delivery O2 Flow Rate FiO2


 


6/8/20 09:55 97.2 89 18 116/60 (78) 96 Room Air  














I&O- Last 24 Hours up to 6 AM 


 


 6/8/20





 06:00


 


Intake Total 3980 ml


 


Output Total 760 ml


 


Balance 3220 ml











Laboratory Data


24H LABS


Laboratory Tests 2


6/7/20 16:57: Methicillin-Resist S.aureus DNA PCR DETECTEDA


6/8/20 06:36: 


Immature Granulocyte % (Auto) 0.5, Neutrophils (%) (Auto) 71.6H, Lymphocytes (%)

(Auto) 13.6L, Monocytes (%) (Auto) 13.0H, Eosinophils (%) (Auto) 0.9, Basophils 

(%) (Auto) 0.4, Neutrophils # (Auto) 6.9, Lymphocytes # (Auto) 1.3L, Monocytes #

(Auto) 1.3H, Eosinophils # (Auto) 0.1, Basophils # (Auto) 0.0, Nucleated Red 

Blood Cells % (auto) 0.0, Anion Gap 8, Glomerular Filtration Rate > 60.0, 

Calcium Level 8.5, Magnesium Level 1.6L


CBC/BMP


Laboratory Tests


6/8/20 06:36








Microbiology





Microbiology


6/7/20 Urine Culture - Final, Complete


         


6/7/20 Blood Culture - Preliminary, Resulted


         


6/7/20 Blood Culture - Preliminary, Resulted





Current Medications





Current Medications








 Medications


  (Trade)  Dose


 Ordered  Sig/Roger


 Route


 PRN Reason  Start Time


 Stop Time Status Last Admin


Dose Admin


 


 Acetaminophen


  (Tylenol Tab)  650 mg  Q4H  PRN


 PO


 PAIN OR FEVER  6/7/20 15:00


     





 


 Cefepime HCl 2 gm/


 Dextrose  50 ml @ 


 100 mls/hr  Q12H


 IV


   6/7/20 15:00


 6/7/20 15:08 DC  





 


 Cefepime HCl 2 gm/


 Dextrose  50 ml @ 


 100 mls/hr  Q8H


 IV


   6/7/20 18:00


    6/8/20 10:47





 


 Heparin Sodium


  (Porcine)


  (Heparin)  5,000 units  Q8H


 SC


   6/7/20 22:00


     





 


 Home Med


  (Med Rec


 Complete!)    ASDIRECTED


 XX


   6/7/20 10:45


 6/7/20 10:45 DC  





 


 Lorazepam


  (Ativan)  1 mg  Q6HP  PRN


 IV


 ANXIETY/AGITATION  6/7/20 17:15


    6/8/20 04:19





 


 Lorazepam


  (Ativan)  2 mg  STAT  STAT


 IV


   6/7/20 11:23


 6/7/20 11:24 DC 6/7/20 12:11





 


 Morphine Sulfate


  (Morphine


 Sulfate Inj)  4 mg  Q4HP  PRN


 IV


 SEVERE PAIN (PS 8-10)  6/7/20 15:00


    6/8/20 02:12





 


 Sodium Chloride  1,000 ml @ 


 100 mls/hr  Q10H


 IV


   6/7/20 15:30


    6/8/20 10:49





 


 Vancomycin HCl


 1000 mg/IV


 Miscellaneous


 Supplies 1 each/


 Dextrose  270 ml @ 


 270 mls/hr  Q8H


 IV


   6/7/20 20:00


    6/8/20 12:01














Allergies


Coded Allergies:  


     No Known Allergies (Unverified , 7/6/19)











Gabriela Gross MD             Jun 8, 2020 16:07
Text Note


Date of Service


The patient was seen on 6/10/20.





NOTE


SUBJECTIVE: 


-HDS, afebrile


-Denies chest pain, n/v/d, fevers, chills, shortness of breath. 


-BCx grew MSSA








OBJECTIVE: 


VITALS: HDS, afebrile


GENERAL: NAD 


EYES: PERRLA, EOMI, anicteric


HENT, MOUTH: NCAT, MMM


NECK: SUPPLE, no JVD


CV: Regular rate and rhythm, S1S2 normal, no murmurs/rubs/gallops


RESPIRATORY: Clear to auscultation bilaterally, no rales/rhonchi/wheezes


GI: Normoactive, soft, nontender, nondistended, no rebound or guarding, no 

organomegaly


EXT: No swelling, no edema, WWP


SKIN:  Scattered scabs on upper and lower ext. Multiple tattoos. Otherwise 

without open lesions, no rashes, no erythema


NEUROLOGIC: No focal deficits





CURRENT MEDICATIONS: Please see below 





LABORATORY DATA: Reviewed.


WBC 8.9


Hgb 10.5


K 4.1


Cr 0.52





MICROBIOLOGY: 


6/9 BCx NGTD


6/7/20 Blood Culture- Staph, MSSA


6/7/20 Blood Culture- Staph, MSSA


 


IMAGING: 


TTE without evidence of vegetations





ASSESSMENT: 29 yo M IVDU with known enterobacter OM, now found to have MSSA 

bacteremia.





PLAN: 


1. MSSA bacteremia likely 2/2 to IV drug use. 


- TTE showed no vegetations, afebrile, WBC wnl. 


- Ordered ARIEL, scheduled for this evening at 5PM


- CXR was wnl


- 6/7 BCx grew MSSA x 2


- UCx negative


- Dc'd Vancomycin given MSSA bacteremia


- 6/9 BCx NGTD


- ID consulted, appreciate recs, will need to stay here for the entire 6 weeks 

of IV abx treatment


- Already on cefepime for Enterobacter OM which will cover MSSA





2. Acute on chronic osteomyelitis of T8/9  2/2 Enterobacter cloacae - likely 2/2

IV drug abuse. 


- Previously left AMA on 5/22


- Patient has a recent diagnosis of osteomyelitis/discitis of T8-T9, diagnosed 

at [facility] s/p [facility].  - Initial culture results were positive for Enterobacter

cloacae; recommendations at that time were cefepime 2g q8h


- Currently patient does not have any focal neurologic deficits; no urinary 

incontinence or bowel incontinence


- with elevated ESR and CRP and 6/7 MRI showing Osteo/Diskitis at the T8/9 level

with surrounding inflammatory changes and phlegmanous changes extending 

cranially towards T5/6 and caudally to T10/11.  No definite abscess formation.  


- Orthopedic surgery was consulted and Dr. Horta recommended no surgical 

intervention at this time


- ID consulted also consulted and recommended to continue cefepime 2gQ8H which 

he will need for 6 weeks





4. Hypomagnesemia: resolved with repletion


- monitor





5. Hyponatremia 2/2 hypotonic hypovolemia, resolved


-s/p IVF





6. Acute kidney injury 2/2 to dehydration.  Resolved with hydration


- Avoid nephrotoxic medications. 


- Daily BMP





7. Substance abuse with hx of IV Methamphetamines and Opiates. 


- Currently no signs/symptoms of withdrawal 


- HIV / Syphilis screen negative on 5/22 


- Ativan PRN





8. Hepatitis C


- Liver function is within normal limits


- Unknown if patient has had treatment. Will discuss with Dr. Sanders, not an acute

care issue for this admission





9. Normocytic anemia


- H/H has been stable since early 2020 


- No signs of acute bleeding 


- studies c/w AOCI with known osteomyelitis and elevated inflammatory markers


- Daily CBC





10. DVT prophylaxis 


- Heparin SQ





DISPOSITION: Currently under inpatient status. Patient is homeless so this 

complicates discharge with patient needing IV abx for 6-8 weeks for 

osteomyelitis. Will need to stay inpatient for the duration of his antibiotic 

therapy.





VS,Fishbone, I+O


VS, Fishbone, I+O


Laboratory Tests


6/10/20 07:42











Vital Signs








  Date Time  Temp Pulse Resp B/P (MAP) Pulse Ox O2 Delivery O2 Flow Rate FiO2


 


6/10/20 06:00 98.7 70 18 132/75 (94) 94 Room Air  














I&O- Last 24 Hours up to 6 AM 


 


 6/10/20





 06:00


 


Intake Total 6925 ml


 


Output Total 4400 ml


 


Balance 2525 ml

















JAELYN LOVE MD   Seth 10, 2020 08:51
Text Note


Date of Service


The patient was seen on 6/11/20.





NOTE


SUBJECTIVE: 


-HDS, afebrile


-Denies chest pain, n/v/d, fevers, chills, shortness of breath. 


-6/7 BCx grew MSSA + E.fecalis (1 of 2 bottles)


-Has ARIEL, tolerated it well, no vegetations were visualized





OBJECTIVE: 


VITALS: HDS, afebrile


GENERAL: NAD 


EYES: PERRLA, EOMI, anicteric


HENT, MOUTH: NCAT, MMM


NECK: SUPPLE, no JVD


CV: Regular rate and rhythm, S1S2 normal, no murmurs/rubs/gallops


RESPIRATORY: Clear to auscultation bilaterally, no rales/rhonchi/wheezes


GI: Normoactive, soft, nontender, nondistended, no rebound or guarding, no 

organomegaly


EXT: No swelling, no edema, WWP


SKIN:  Scattered scabs on upper and lower ext. Multiple tattoos. Otherwise 

without open lesions, no rashes, no erythema


NEUROLOGIC: No focal deficits





CURRENT MEDICATIONS: Please see below 





LABORATORY DATA: Reviewed.


WBC 11.6


Hgb 11.2


K 3.8


Cr 0.62





MICROBIOLOGY: 


6/9 BCx NGTD


6/7/20 Blood Culture- Staph, MSSA


6/7/20 Blood Culture- Staph, MSSA, E.fecalis


 


IMAGING: 


TTE without evidence of vegetations


ARIEL without evidence of vegetations





ASSESSMENT: 29 yo M IVDU with known enterobacter OM, now found to have MSSA and 

E.fecalis bacteremia.





PLAN: 


1. MSSA and E fecalis bacteremia likely 2/2 to IV drug use. 


- ARIEL showed no vegetations


- CXR was wnl


- 6/7 BCx grew MSSA x 2, and one of the cultures grew E.fecalis


- UCx was negative


- Dc'd Vancomycin given MSSA bacteremia, continued cefepime


- 6/9 BCx NGTD


- ID consulted, appreciate recs, will need to stay here for the entire 6 weeks o

f IV abx treatment


- Already on cefepime for Enterobacter OM which will cover MSSA. Will add back 

vanc for E.fecalis now growing in 6/7 BCx





2. Acute on chronic osteomyelitis of T8/9  2/2 Enterobacter cloacae - likely 2/2

IV drug abuse. 


- Previously left AMA on 5/22


- Patient has a recent diagnosis of osteomyelitis/discitis of T8-T9, diagnosed 

at [facility] s/p biopsy.  - Initial culture results were positive for Enterobacter

cloacae; recommendations at that time were cefepime 2g q8h


- Currently patient does not have any focal neurologic deficits; no urinary 

incontinence or bowel incontinence


- with elevated ESR and CRP and 6/7 MRI showing Osteo/Diskitis at the T8/9 level

with surrounding inflammatory changes and phlegmanous changes extending 

cranially towards T5/6 and caudally to T10/11.  No definite abscess formation.  


- Orthopedic surgery was consulted and Dr. Horta recommended no surgical 

intervention at this time


- ID consulted also consulted and recommended to continue cefepime 2gQ8H which 

he will need for 6 weeks





4. Hypomagnesemia: resolved with repletion


- monitor





5. Hyponatremia 2/2 hypotonic hypovolemia, resolved


-s/p IVF





6. Acute kidney injury 2/2 to dehydration.  Resolved with hydration


- Avoid nephrotoxic medications. 


- Daily BMP





7. Substance abuse with hx of IV Methamphetamines and Opiates. 


- Currently no signs/symptoms of withdrawal 


- HIV / Syphilis screen negative on 5/22 


- Ativan PRN





8. Hepatitis C


- Liver function is within normal limits


- Unknown if patient has had treatment. Will discuss with Dr. Sanders, not an acute

care issue for this admission





9. Normocytic anemia


- H/H has been stable since early 2020 


- No signs of acute bleeding 


- studies c/w AOCI with known osteomyelitis and elevated inflammatory markers


- Daily CBC





10. DVT prophylaxis 


- Heparin SQ





DISPOSITION: Currently under inpatient status. Patient is homeless so this 

complicates discharge with patient needing IV abx for 6-8 weeks for 

osteomyelitis. Will need to stay inpatient for the duration of his antibiotic 

therapy.





VS,Fishbone, I+O


VS, Fishbone, I+O


Laboratory Tests


6/11/20 09:46











Vital Signs








  Date Time  Temp Pulse Resp B/P (MAP) Pulse Ox O2 Delivery O2 Flow Rate FiO2


 


6/11/20 14:00 97.9 65 17 117/57 (77) 98 Room Air  














I&O- Last 24 Hours up to 6 AM 


 


 6/11/20





 05:59


 


Intake Total 4215 ml


 


Output Total 1825 ml


 


Balance 2390 ml

















JAELYN LOVE MD 11, 2020 17:30
Text Note


Date of Service


The patient was seen on 6/12/20.





NOTE


SUBJECTIVE: 


-HDS, afebrile


-Denies chest pain, n/v/d, fevers, chills, shortness of breath. 


-6/7 BCx grew MSSA + E.fecalis (1 of 2 bottles)





OBJECTIVE: 


VITALS: HDS, afebrile


GENERAL: NAD 


EYES: PERRLA, EOMI, anicteric


HENT, MOUTH: NCAT, MMM


NECK: SUPPLE, no JVD


CV: Regular rate and rhythm, S1S2 normal, no murmurs/rubs/gallops


RESPIRATORY: Clear to auscultation bilaterally, no rales/rhonchi/wheezes


GI: Normoactive, soft, nontender, nondistended, no rebound or guarding, no 

organomegaly


EXT: No swelling, no edema, WWP


SKIN:  Scattered scabs on upper and lower ext. Multiple tattoos. Otherwise 

without open lesions, no rashes, no erythema


NEUROLOGIC: No focal deficits





CURRENT MEDICATIONS: Please see below 





LABORATORY DATA: Reviewed. AM labs are pending





MICROBIOLOGY: 


6/9 BCx NGTD


6/7/20 Blood Culture- Staph, MSSA


6/7/20 Blood Culture- Staph, MSSA, E.fecalis


 


IMAGING: 


TTE without evidence of vegetations


ARIEL without evidence of vegetations





ASSESSMENT: 29 yo M IVDU with known enterobacter OM, now found to have MSSA 

bacteremia as well as 1 bottle growing E.fecalis that was deemed likely 

contaminant.





PLAN: 


1. MSSA likely 2/2 to IV drug use. 1 of the 6/7/BCx bottles growing E.fecalis 

that was deemed likely contaminant.


- ARIEL showed no vegetations


- CXR was wnl


- 6/7 BCx grew MSSA x 2, and one of the cultures grew E.fecalis


- UCx was negative


- Dc'd Vancomycin given MSSA bacteremia, continued cefepime


- 6/9 BCx NGTD


- ID consulted, appreciate recs, will need to stay here for the entire 6 weeks 

of IV abx treatment


- Already on cefepime for Enterobacter OM which will cover MSSA. Per ID, will 

not add back vanc for the E.fecalis as it was likely a contaminant





2. Acute on chronic osteomyelitis of T8/9  2/2 Enterobacter cloacae - likely 2/2

IV drug abuse. 


- Previously left AMA on 5/22


- Patient has a recent diagnosis of osteomyelitis/discitis of T8-T9, diagnosed 

at [facility] s/p biopsy.  - Initial culture results were positive for Enterobacter

cloacae; recommendations at that time were cefepime 2g q8h


- Currently patient does not have any focal neurologic deficits; no urinary 

incontinence or bowel incontinence


- with elevated ESR and CRP and 6/7 MRI showing Osteo/Diskitis at the T8/9 level

with surrounding inflammatory changes and phlegmanous changes extending 

cranially towards T5/6 and caudally to T10/11.  No definite abscess formation.  


- Orthopedic surgery was consulted and Dr. Horta recommended no surgical 

intervention at this time


- ID consulted also consulted and recommended to continue cefepime 2gQ8H, at 

this time suggesting 2 weeks of IV abx and then switch to PO for a 6 week 

course.





4. Hypomagnesemia: repleted


- monitor





5. Hyponatremia 2/2 hypotonic hypovolemia, resolved


-s/p IVF, stop fluids at this time.





6. Acute kidney injury 2/2 to dehydration.  Resolved with hydration


- Avoid nephrotoxic medications. 


- Daily BMP





7. Substance abuse with hx of IV Methamphetamines and Opiates. 


- Currently no signs/symptoms of withdrawal 


- HIV / Syphilis screen negative on 5/22 


- Ativan PRN





8. Hepatitis C


- Liver function is within normal limits


- Unknown if patient has had treatment. Not an acute care issue for this 

admission.





9. Normocytic anemia


- H/H has been stable since early 2020 


- No signs of acute bleeding 


- studies c/w AOCI with known osteomyelitis and elevated inflammatory markers


- Daily CBC





10. DVT prophylaxis 


- Heparin SQ





DISPOSITION: Currently under inpatient status. Patient is homeless so this comp

licates discharge. For now, per ID, will plan on 2 weeks IV abx inpatient and 

then switch to PO. PFS also onboard.





VS,Fishbone, I+O


VS, Fishbone, I+O


Laboratory Tests


6/11/20 09:46











Vital Signs








  Date Time  Temp Pulse Resp B/P (MAP) Pulse Ox O2 Delivery O2 Flow Rate FiO2


 


6/12/20 06:00 97.9 62 18 107/71 (83) 98 Room Air  














I&O- Last 24 Hours up to 6 AM 


 


 6/12/20





 06:00


 


Intake Total 5170 ml


 


Output Total 1500 ml


 


Balance 3670 ml

















JAELYN LOVE MD   Jun 12, 2020 07:44
Text Note


Date of Service


The patient was seen on 6/13/20.





NOTE


SUBJECTIVE: 


-HDS, afebrile


-Denies chest pain, n/v/d, fevers, chills, shortness of breath. 





OBJECTIVE: 


VITALS: HDS, afebrile


GENERAL: NAD 


EYES: PERRLA, EOMI, anicteric


HENT, MOUTH: NCAT, MMM


NECK: SUPPLE, no JVD


CV: Regular rate and rhythm, S1S2 normal, no murmurs/rubs/gallops


RESPIRATORY: Clear to auscultation bilaterally, no rales/rhonchi/wheezes


GI: Normoactive, soft, nontender, nondistended, no rebound or guarding, no 

organomegaly


EXT: No swelling, no edema, WWP


SKIN:  Scattered scabs on upper and lower ext. Multiple tattoos. Otherwise 

without open lesions, no rashes, no erythema


NEUROLOGIC: No focal deficits





CURRENT MEDICATIONS: Please see below 





LABORATORY DATA: Reviewed. AM labs are pending





MICROBIOLOGY: 


6/9 BCx NGTD


6/7/20 Blood Culture- Staph, MSSA


6/7/20 Blood Culture- Staph, MSSA, E.fecalis


 


IMAGING: 


TTE without evidence of vegetations


ARIEL without evidence of vegetations





ASSESSMENT: 31 yo M IVDU with known enterobacter OM, now found to have MSSA 

bacteremia as well as 1 bottle growing E.fecalis that was deemed likely 

contaminant.





PLAN: 


1. MSSA likely 2/2 to IV drug use. 1 of the 6/7/BCx bottles growing E.fecalis 

that was deemed likely contaminant.


- ARIEL showed no vegetations


- CXR was wnl


- 6/7 BCx grew MSSA x 2, and one of the cultures grew E.fecalis


- UCx was negative


- Dc'd Vancomycin given MSSA bacteremia, continued cefepime


- 6/9 BCx NGTD


- ID consulted, appreciate recs, will need at least 2 weeks of IV cefepime, then

will consider switch to PO levaquin


- Already on cefepime for Enterobacter OM which will cover MSSA. 





2. Acute on chronic osteomyelitis of T8/9  2/2 Enterobacter cloacae - likely 2/2

IV drug abuse. 


- Previously left AMA on 5/22


- Patient has a recent diagnosis of osteomyelitis/discitis of T8-T9, diagnosed 

at [facility] s/p biopsy.  - Initial culture results were positive for Enterobacter

cloacae; recommendations at that time were cefepime 2g q8h


- Currently patient does not have any focal neurologic deficits; no urinary 

incontinence or bowel incontinence


- with elevated ESR and CRP and 6/7 MRI showing Osteo/Diskitis at the T8/9 level

with surrounding inflammatory changes and phlegmanous changes extending 

cranially towards T5/6 and caudally to T10/11.  No definite abscess formation.  


- Orthopedic surgery was consulted and Dr. Horta recommended no surgical 

intervention at this time


- ID consulted also consulted and recommended to continue cefepime 2gQ8H, at 

this time suggesting 2 weeks of IV abx and then switch to PO levaquin for a 6 

week course.





4. Hypomagnesemia: repleted


- monitor





5. Hyponatremia 2/2 hypotonic hypovolemia, resolved


-s/p IVF, stop fluids at this time.





6. Acute kidney injury 2/2 to dehydration.  Resolved with hydration


- Avoid nephrotoxic medications. 


- Daily BMP





7. Substance abuse with hx of IV Methamphetamines and Opiates. 


- Currently no signs/symptoms of withdrawal 


- HIV / Syphilis screen negative on 5/22 


- Ativan PRN





8. Hepatitis C


- Liver function is within normal limits


- per ID, has been treated 3 times but possibility is high for reinfection given

persistent IVDU, f/u pending viral load





9. Normocytic anemia


- H/H has been stable since early 2020 


- No signs of acute bleeding 


- studies c/w AOCI with known osteomyelitis and elevated inflammatory markers


- Daily CBC





10. DVT prophylaxis 


- Heparin SQ





11. Insomnia:


-Tried rozerem without effect, will give trazodone 25mg QHS tonight





DISPOSITION: Currently under inpatient status. Patient is homeless so this 

complicates discharge. For now, per ID, will plan on 2 weeks IV abx inpatient 

and then switch to PO. PFS also onboard.





VS,Fishbone, I+O


VS, Fishbone, I+O


Laboratory Tests


6/12/20 09:12











Vital Signs








  Date Time  Temp Pulse Resp B/P (MAP) Pulse Ox O2 Delivery O2 Flow Rate FiO2


 


6/12/20 22:00 98.1 66 18 120/65 (83) 97 Room Air  














I&O- Last 24 Hours up to 6 AM 


 


 6/13/20





 06:00


 


Intake Total 5405 ml


 


Output Total 4375 ml


 


Balance 1030 ml

















JAELYN LOVE MD   Jun 13, 2020 07:57
Text Note


Date of Service


The patient was seen on 6/14/20.





NOTE


SUBJECTIVE: 


-HDS, afebrile


-Denies chest pain, n/v/d, fevers, chills, shortness of breath. 





OBJECTIVE: 


VITALS: HDS, afebrile


GENERAL: NAD 


EYES: PERRLA, EOMI, anicteric


HENT, MOUTH: NCAT, MMM


NECK: SUPPLE, no JVD


CV: Regular rate and rhythm, S1S2 normal, no murmurs/rubs/gallops


RESPIRATORY: Clear to auscultation bilaterally, no rales/rhonchi/wheezes


GI: Normoactive, soft, nontender, nondistended, no rebound or guarding, no 

organomegaly


EXT: No swelling, no edema, WWP


SKIN:  Scattered scabs on upper and lower ext. Multiple tattoos. Otherwise 

without open lesions, no rashes, no erythema


NEUROLOGIC: No focal deficits





CURRENT MEDICATIONS: Please see below 





LABORATORY DATA: Reviewed. 


WBC 11.6


Hgb 11.1


Cr 0.63


mag 1.7





MICROBIOLOGY: 


6/10 BCx NGTD


6/9 BCx Negative


6/7/20 Blood Culture- Staph, MSSA


6/7/20 Blood Culture- Staph, MSSA, E.fecalis


 


IMAGING: 


TTE without evidence of vegetations


ARIEL without evidence of vegetations





ASSESSMENT: 29 yo M IVDU with known enterobacter OM, now found to have MSSA 

bacteremia as well as 1 bottle growing E.fecalis that was deemed likely 

contaminant.





PLAN: 


1. MSSA likely 2/2 to IV drug use. 1 of the 6/7/BCx bottles growing E.fecalis 

that was deemed likely contaminant.


- ARIEL showed no vegetations


- CXR was wnl


- 6/7 BCx grew MSSA x 2, and one of the cultures grew E.fecalis


- UCx was negative


- Dc'd Vancomycin given MSSA bacteremia, continued cefepime


- 6/9 BCx NGTD


- ID consulted, appreciate recs, will need at least 2 weeks of IV cefepime, then

will consider switch to PO levaquin


- Already on cefepime for Enterobacter OM which will cover MSSA. 





2. Acute on chronic osteomyelitis of T8/9  2/2 Enterobacter cloacae - likely 2/2

IV drug abuse. 


- Previously left AMA on 5/22


- Patient has a recent diagnosis of osteomyelitis/discitis of T8-T9, diagnosed 

at [facility] s/p biopsy.  - Initial culture results were positive for Enterobacter

cloacae; recommendations at that time were cefepime 2g q8h


- Currently patient does not have any focal neurologic deficits; no urinary 

incontinence or bowel incontinence


- with elevated ESR and CRP and 6/7 MRI showing Osteo/Diskitis at the T8/9 level

with surrounding inflammatory changes and phlegmanous changes extending 

cranially towards T5/6 and caudally to T10/11.  No definite abscess formation.  


- Orthopedic surgery was consulted and Dr. Horta recommended no surgical interve

ntion at this time


- ID consulted also consulted and recommended to continue cefepime 2gQ8H, at 

this time suggesting 2 weeks of IV abx and then switch to PO levaquin for a 6 

week course.





4. Hypomagnesemia: repleted


- monitor





5. Hyponatremia 2/2 hypotonic hypovolemia, resolved


-s/p IVF, stop fluids at this time.





6. Acute kidney injury 2/2 to dehydration.  Resolved with hydration


- Avoid nephrotoxic medications. 


- Daily BMP





7. Substance abuse with hx of IV Methamphetamines and Opiates. 


- Currently no signs/symptoms of withdrawal 


- HIV / Syphilis screen negative on 5/22 


- Ativan PRN





8. Hepatitis C


- Liver function is within normal limits


- per ID, has been treated 3 times but possibility is high risk for reinfection 

given persistent IVDU, f/u pending viral load





9. Normocytic anemia


- H/H has been stable since early 2020 


- No signs of acute bleeding 


- studies c/w AOCI with known osteomyelitis and elevated inflammatory markers


- Daily CBC





10. DVT prophylaxis 


- Heparin SQ





11. Insomnia:


-ambien 5 QHS





DISPOSITION: Currently under inpatient status. Patient is homeless so this 

complicates discharge. For now, per ID, will plan on 2 weeks IV abx inpatient 

and then switch to PO. PFS also onboard.





VS,Fishbone, I+O


VS, Fishbone, I+O


Laboratory Tests


6/13/20 12:24








6/14/20 05:39











Vital Signs








  Date Time  Temp Pulse Resp B/P (MAP) Pulse Ox O2 Delivery O2 Flow Rate FiO2


 


6/14/20 05:04   16     


 


6/14/20 05:00 97.8 73  140/75 (96) 94 Room Air  














I&O- Last 24 Hours up to 6 AM 


 


 6/14/20





 06:00


 


Intake Total 6105 ml


 


Output Total 3525 ml


 


Balance 2580 ml

















JAELYN LOVE MD   Jun 14, 2020 08:20
Text Note


Date of Service


The patient was seen on 6/15/20.





NOTE


SUBJECTIVE: 


-HDS, afebrile


-Denies chest pain, n/v/d, fevers, chills, shortness of breath. 


-Refused labs this AM, then had them later midmorning


-Reports mid backpain that he says is characteristically worse than prior


-Slept much better with the ambien





OBJECTIVE: 


VITALS: HDS, afebrile


GENERAL: NAD 


EYES: PERRLA, EOMI, anicteric


HENT, MOUTH: NCAT, MMM


NECK: SUPPLE, no JVD


CV: Regular rate and rhythm, S1S2 normal, no murmurs/rubs/gallops


RESPIRATORY: Clear to auscultation bilaterally, no rales/rhonchi/wheezes


GI: Normoactive, soft, nontender, nondistended, no rebound or guarding, no 

organomegaly


EXT: No swelling, no edema, WWP


SKIN:  Scattered scabs on upper and lower ext. Multiple tattoos. Otherwise 

without open lesions, no rashes, no erythema


NEUROLOGIC: No focal deficits





CURRENT MEDICATIONS: Please see below 





LABORATORY DATA: Reviewed. 


WBC 10.7


Hgb 10.9


Cr 0.64





MICROBIOLOGY: 


6/10 BCx NGTD


6/9 BCx Negative


6/7/20 Blood Culture- Staph, MSSA


6/7/20 Blood Culture- Staph, MSSA, E.fecalis


 


IMAGING: 


TTE without evidence of vegetations


ARIEL without evidence of vegetations





ASSESSMENT: 29 yo M IVDU with known enterobacter OM, now found to have MSSA 

bacteremia as well as 1 bottle growing E.fecalis that was deemed likely 

contaminant.





PLAN: 


1. MSSA likely 2/2 to IV drug use. 1 of the 6/7/BCx bottles growing E.fecalis 

that was deemed likely contaminant.


- ARIEL showed no vegetations


- CXR was wnl


- 6/7 BCx grew MSSA x 2, and one of the cultures grew E.fecalis


- UCx was negative


- Continue cefepime


- 6/9 BCx NGTD


- ID consulted, appreciate recs, will need at least 2 weeks of IV cefepime, and 

given worsening back pain and pending T-spine MRI, may be developing an epidural

abscess and may require drainage + IV abx. Follow up T-spine MRI.





2. Acute on chronic osteomyelitis of T8/9  2/2 Enterobacter cloacae - likely 2/2

IV drug abuse. 


- Previously left AMA on 5/22


- Patient has a recent diagnosis of osteomyelitis/discitis of T8-T9, diagnosed 

at [facility] s/p [facility].  - Initial culture results were positive for Enterobacter

cloacae; recommendations at that time were cefepime 2g q8h


- ID consulted, appreciate recs, will need at least 2 weeks of IV cefepime, and 

given worsening back pain and pending T-spine MRI, may be developing an epidural

abscess and may require drainage + IV abx. Follow up T-spine MRI.


- with elevated ESR and CRP and 6/7 MRI showing Osteo/Diskitis at the T8/9 level

with surrounding inflammatory changes and phlegmanous changes extending 

cranially towards T5/6 and caudally to T10/11.  No definite abscess formation at

that time.  


- Orthopedic surgery was consulted and Dr. Horta recommended no surgical 

intervention at that time.





4. Hypomagnesemia: repleted


- monitor





5. Hyponatremia 2/2 hypotonic hypovolemia, resolved





6. Acute kidney injury 2/2 to dehydration.  Resolved with hydration


- Avoid nephrotoxic medications. 


- Daily BMP





7. Substance abuse with hx of IV Methamphetamines and Opiates. 


- Currently no signs/symptoms of withdrawal 


- HIV / Syphilis screen negative on 5/22 


- Ativan PRN





8. Hepatitis C


- Liver function is within normal limits


- per ID, has been treated 3 times but possibility is high risk for reinfection 

given persistent IVDU, f/u pending viral load





9. Normocytic anemia


- H/H has been stable since early 2020 


- No signs of acute bleeding 


- studies c/w AOCI with known osteomyelitis and elevated inflammatory markers


- Daily CBC





10. DVT prophylaxis 


- Heparin SQ





11. Insomnia:


-ambien 5 QHS





DISPOSITION: Currently under inpatient status. Patient is homeless so this 

complicates discharge. May need to do entire treatment course inpatient.





VS,Fishbone, I+O


VS, Fishbone, I+O


Laboratory Tests


6/15/20 11:24











Vital Signs








  Date Time  Temp Pulse Resp B/P (MAP) Pulse Ox O2 Delivery O2 Flow Rate FiO2


 


6/15/20 16:48   16   Room Air  


 


6/15/20 14:00 98.6 69  132/61 (84) 99   














I&O- Last 24 Hours up to 6 AM 


 


 6/15/20





 06:00


 


Intake Total 1620 ml


 


Output Total 3970 ml


 


Balance -2350 ml

















JAELYN LOVE MD   Seth 15, 2020 18:24
Text Note


Date of Service


The patient was seen on 6/9/20.





NOTE


SUBJECTIVE: 


-HDS, afebrile


-Denies chest pain, n/v/d, fevers, chills, shortness of breath. 


-BCx x 2 sets growing Staph, however vanc had been dc'd despite MRSA PCR 

screen+. 








OBJECTIVE: 


VITALS: HDS, afebrile


GENERAL: NAD 


EYES: PERRLA, EOMI, anicteric


HENT, MOUTH: NCAT, MMM


NECK: SUPPLE, no JVD


CV: Regular rate and rhythm, S1S2 normal, no murmurs/rubs/gallops


RESPIRATORY: Clear to auscultation bilaterally, no rales/rhonchi/wheezes


GI: Normoactive, soft, nontender, nondistended, no rebound or guarding, no 

organomegaly


EXT: No swelling, no edema, WWP


SKIN:  Scattered scabs on upper and lower ext. has multiple tattooes. Otherwise 

without draining or open lesions, no rashes, no erythema


NEUROLOGIC: No focal deficits





CURRENT MEDICATIONS: Please see below 





LABORATORY DATA: Reviewed.


WBC 6.9


Hgb 10.9


platelets 239


na 137


K 4.3


Cr 0.56


Ferritin 337


Fe 27


%sat 12.1


TIBC 223








MICROBIOLOGY: 


6/7/20 Urine Culture, negative


6/7/20 Blood Culture- Staph, sensitivities pending


6/7/20 Blood Culture- Staph, sensitivities pending


 


IMAGING: 


TTE without evidence of vegetations





ASSESSMENT: 31 yo M IVDU with known enterobacter OM, now found to have staph 

bacteremia.





PLAN: 


1. Staph bacteremia likely 2/2 to IV drug use. 


- TTE showed no vegetations, afebrile, WBC wnl. 


- CXR neg 


- 6/7 BCx growing staph x 2, staph species and sensitivities pending


- F/u UCx negative


- Restart Vancomycin @ 1250 mg Q8H


- just andrew 6/9 BCx


- ID consulted, appreciate recs





2. Acute on chronic osteomyelitis of T8/9  2/2 Enterobacter cloacae - likely 2/2

IV drug abuse. 


- Previously left AMA on 5/22


- Patient has a recent diagnosis of osteomyelitis/discitis of T8-T9, diagnosed 

at [facility] s/p [facility].  - Initial culture results were positive for Enterobacter

cloacae; recommendations at that time were cefepime 2g q8h


- Currently patient does not have any focal neurologic deficits; no urinary 

incontinence or bowel incontinence


- with elevated ESR and CRP and 6/7 MRI showing Osteo/Diskitis at the T8/9 level

with surrounding inflammatory changes and phlegmanous changes extending cran

ially towards T5/6 and caudally to T10/11.  No definite abscess formation.  


- Orthopedic surgery was consulted and Dr. Horta recommended no surgical 

intervention at this time


- ID consulted also consulted and recommended to continue cefepine 2gQ8H which 

he will need for 6 weeks





4. Hypomagnesemia: resolved with repletion


- monitor





5. Hyponatremia 2/2 hypotonic hypovolemia, resolved


-s/p IVF





6. Acute kidney injury 2/2 to dehydration.  Resolved with hydration


- Avoid nephrotoxic medications. 


- Daily BMP





7. Substance abuse with hx of IV Methamphetamines and Opiates. 


- Currently no signs/symptoms of withdrawal 


- HIV / Syphilis screen negative on 5/22 


- Ativan PRN





8. Hepatitis C


- Liver function is within normal limits


- Unknown if patient has had treatment. Will discuss with Dr. Sanders





9. Normocytic anemia


- H/H has been stable since early 2020 


- No signs of acute bleeding 


- studies c/w AOCI with known osteomyelitis and elevated inflammatory markers


- Daily CBC





10. DVT prophylaxis 


- Heparin SQ





DISPOSITION: Currently under inpatient status. Patient is homeless so this 

complicates discharge with patient needing IV abx for 6-8 weeks for 

osteomyelitis. Discussions will need to take place about either staying in 

hospital vs. placement (which would be difficult due to his IV drug use, hx of 

leaving AMA). Will discuss with team.





VS,Fishbone, I+O


VS, Fishbone, I+O


Laboratory Tests


6/9/20 05:56











Vital Signs








  Date Time  Temp Pulse Resp B/P (MAP) Pulse Ox O2 Delivery O2 Flow Rate FiO2


 


6/9/20 06:00 98.3 74 18 116/59 (78) 95 Room Air  














I&O- Last 24 Hours up to 6 AM 


 


 6/9/20





 06:00


 


Intake Total 4190 ml


 


Output Total 3125 ml


 


Balance 1065 ml

















JAELYN LOVE MD    Jun 9, 2020 09:05
No